# Patient Record
Sex: FEMALE | Race: WHITE | Employment: UNEMPLOYED | URBAN - METROPOLITAN AREA
[De-identification: names, ages, dates, MRNs, and addresses within clinical notes are randomized per-mention and may not be internally consistent; named-entity substitution may affect disease eponyms.]

---

## 2017-01-07 ENCOUNTER — HOSPITAL ENCOUNTER (EMERGENCY)
Age: 82
Discharge: HOME | End: 2017-01-07
Payer: MEDICARE

## 2017-01-07 VITALS
OXYGEN SATURATION: 92 % | HEIGHT: 66 IN | TEMPERATURE: 98 F | WEIGHT: 168 LBS | HEART RATE: 60 BPM | RESPIRATION RATE: 20 BRPM | BODY MASS INDEX: 27 KG/M2 | SYSTOLIC BLOOD PRESSURE: 156 MMHG | DIASTOLIC BLOOD PRESSURE: 96 MMHG

## 2017-01-07 DIAGNOSIS — R04.0 EPISTAXIS: Primary | ICD-10-CM

## 2017-01-07 LAB
APTT PPP: 27.1 SEC (ref 25.3–32.9)
BASOPHILS # BLD AUTO: 0.1 K/UL (ref 0–0.2)
BASOPHILS # BLD: 1 % (ref 0–2)
DIFFERENTIAL METHOD BLD: ABNORMAL
EOSINOPHIL # BLD: 0.4 K/UL (ref 0–0.8)
EOSINOPHIL NFR BLD: 5 % (ref 0.5–7.8)
ERYTHROCYTE [DISTWIDTH] IN BLOOD BY AUTOMATED COUNT: 14.2 % (ref 11.9–14.6)
HCT VFR BLD AUTO: 41.2 % (ref 35.8–46.3)
HGB BLD-MCNC: 13.6 G/DL (ref 11.7–15.4)
IMM GRANULOCYTES # BLD: 0 K/UL (ref 0–0.5)
IMM GRANULOCYTES NFR BLD AUTO: 0.2 % (ref 0–5)
INR PPP: 1.1 (ref 0.9–1.2)
LYMPHOCYTES # BLD AUTO: 18 % (ref 13–44)
LYMPHOCYTES # BLD: 1.5 K/UL (ref 0.5–4.6)
MCH RBC QN AUTO: 30.2 PG (ref 26.1–32.9)
MCHC RBC AUTO-ENTMCNC: 33 G/DL (ref 31.4–35)
MCV RBC AUTO: 91.4 FL (ref 79.6–97.8)
MONOCYTES # BLD: 0.7 K/UL (ref 0.1–1.3)
MONOCYTES NFR BLD AUTO: 9 % (ref 4–12)
NEUTS SEG # BLD: 5.4 K/UL (ref 1.7–8.2)
NEUTS SEG NFR BLD AUTO: 67 % (ref 43–78)
PLATELET # BLD AUTO: 245 K/UL (ref 150–450)
PMV BLD AUTO: 10.7 FL (ref 10.8–14.1)
PROTHROMBIN TIME: 11.4 SEC (ref 9.6–12)
RBC # BLD AUTO: 4.51 M/UL (ref 4.05–5.25)
WBC # BLD AUTO: 8.1 K/UL (ref 4.3–11.1)

## 2017-01-07 PROCEDURE — 85610 PROTHROMBIN TIME: CPT

## 2017-01-07 PROCEDURE — 75810000284 HC CNTRL NASAL HEMORHRAGE SIMPLE

## 2017-01-07 PROCEDURE — 85025 COMPLETE CBC W/AUTO DIFF WBC: CPT

## 2017-01-07 PROCEDURE — 77030013848 HC PK NSL EPITAX S&N -B

## 2017-01-07 PROCEDURE — 99284 EMERGENCY DEPT VISIT MOD MDM: CPT

## 2017-01-07 PROCEDURE — 85730 THROMBOPLASTIN TIME PARTIAL: CPT

## 2017-01-07 PROCEDURE — 74011250637 HC RX REV CODE- 250/637

## 2017-01-07 RX ORDER — HYDROCODONE BITARTRATE AND ACETAMINOPHEN 5; 325 MG/1; MG/1
1 TABLET ORAL
Qty: 6 TAB | Refills: 0 | Status: SHIPPED | OUTPATIENT
Start: 2017-01-07 | End: 2017-03-01

## 2017-01-07 RX ORDER — CEPHALEXIN 500 MG/1
500 CAPSULE ORAL 4 TIMES DAILY
Qty: 28 CAP | Refills: 0 | Status: SHIPPED | OUTPATIENT
Start: 2017-01-07 | End: 2017-01-14

## 2017-01-07 RX ORDER — OXYMETAZOLINE HCL 0.05 %
2 SPRAY, NON-AEROSOL (ML) NASAL
Status: COMPLETED | OUTPATIENT
Start: 2017-01-07 | End: 2017-01-07

## 2017-01-07 RX ADMIN — OXYMETAZOLINE HYDROCHLORIDE 2 SPRAY: 5 SPRAY NASAL at 14:10

## 2017-01-07 NOTE — ED PROVIDER NOTES
HPI Comments: 68-year-old female brought him or spell for anterior nosebleed. Patient's no spontaneous start bleeding about 3 hours ago family instructed compression without complete resolution of the bleeding. Patient is not on any anticoagulation therapy other than aspirin a day. Patient is a 80 y.o. female presenting with epistaxis. The history is provided by the patient and a relative. Epistaxis    This is a new problem. The current episode started 3 to 5 hours ago. The problem occurs constantly. The problem has not changed since onset. The problem is associated with nothing. The bleeding has been from the left nare. She has tried applying pressure for the symptoms. The treatment provided no relief. Her past medical history does not include bleeding disorder or sinus problems. Past Medical History:   Diagnosis Date    Arthritis     Cancer ([facility] [address]      Colon    Hypertension     Ill-defined condition      MVP    Ill-defined condition      PVD    Ill-defined condition      Urinary retention    Neurological disorder     Psychiatric disorder      Anxiety/depression       Past Surgical History:   Procedure Laterality Date    Hx gyn      Hx heent           No family history on file. Social History     Social History    Marital status:      Spouse name: N/A    Number of children: N/A    Years of education: N/A     Occupational History    Not on file. Social History Main Topics    Smoking status: Not on file    Smokeless tobacco: Not on file    Alcohol use No    Drug use: No    Sexual activity: Not on file     Other Topics Concern    Not on file     Social History Narrative         ALLERGIES: Neurontin [gabapentin]    Review of Systems   Constitutional: Negative. Negative for activity change. HENT: Negative. Eyes: Negative. Respiratory: Negative. Cardiovascular: Negative. Gastrointestinal: Negative. Genitourinary: Negative. Musculoskeletal: Negative. Skin: Negative. Neurological: Negative. Psychiatric/Behavioral: Negative. All other systems reviewed and are negative. Vitals:    01/07/17 1302   BP: 139/88   Pulse: 62   Resp: 20   Temp: 97.9 °F (36.6 °C)   SpO2: 94%   Weight: 76.2 kg (168 lb)   Height: 5' 6\" (1.676 m)            Physical Exam   Constitutional: She is oriented to person, place, and time. She appears well-developed and well-nourished. No distress. HENT:   Head: Normocephalic and atraumatic. Right Ear: External ear normal.   Left Ear: External ear normal.   Nose: Epistaxis is observed. Mouth/Throat: Oropharynx is clear and moist. No oropharyngeal exudate. Eyes: Conjunctivae and EOM are normal. Pupils are equal, round, and reactive to light. Right eye exhibits no discharge. Left eye exhibits no discharge. No scleral icterus. Neck: Normal range of motion. Neck supple. No JVD present. No tracheal deviation present. Cardiovascular: Normal rate, regular rhythm and intact distal pulses. Pulmonary/Chest: Effort normal and breath sounds normal. No stridor. No respiratory distress. She has no wheezes. She exhibits no tenderness. Abdominal: Soft. Bowel sounds are normal. She exhibits no distension and no mass. There is no tenderness. Musculoskeletal: Normal range of motion. She exhibits no edema or tenderness. Neurological: She is alert and oriented to person, place, and time. No cranial nerve deficit. Skin: Skin is warm and dry. No rash noted. She is not diaphoretic. No erythema. No pallor. Psychiatric: She has a normal mood and affect. Her behavior is normal. Thought content normal.   Nursing note and vitals reviewed. MDM  Number of Diagnoses or Management Options  Diagnosis management comments: Nasal balloon inserted without difficulty 5 cc's of air complaining balloon with good hemostasis.        Amount and/or Complexity of Data Reviewed  Clinical lab tests: reviewed and ordered  Tests in the medicine section of CPT®: reviewed and ordered    Risk of Complications, Morbidity, and/or Mortality  Presenting problems: moderate  Diagnostic procedures: moderate  Management options: moderate      ED Course       Epistaxis Management  Date/Time: 1/7/2017 2:18 PM  Performed by: Zoie Vega  Authorized by: Zoie Vega     Consent:     Consent obtained:  Verbal    Consent given by:  Patient and healthcare agent    Risks discussed:  Bleeding, infection, nasal injury and pain    Alternatives discussed:  Delayed treatment, alternative treatment and referral  Anesthesia (see MAR for exact dosages): Anesthesia method:  None  Procedure details:     Treatment site:  L anterior    Treatment complexity:  Limited    Treatment episode: initial    Post-procedure details:     Assessment:  Bleeding stopped    Patient tolerance of procedure:   Tolerated well, no immediate complications

## 2017-01-07 NOTE — ED NOTES
I have reviewed medications, follow up provider options, and discharge instructions with the patient and daughter. The patient and daughter verbalized understanding. Copy of discharge information given to daughter upon discharge. Prescription(s) given to daughter. Patient discharged in no distress.  Patient instructed not to drive while under influence of Norco.

## 2017-01-07 NOTE — DISCHARGE INSTRUCTIONS
Nosebleeds: Care Instructions  Your Care Instructions    Nosebleeds are common, especially if you have colds or allergies. Many things can cause a nosebleed. Some nosebleeds stop on their own with pressure. Others need packing. Some get cauterized (sealed). If you have gauze or other packing materials in your nose, you will need to follow up with your doctor to have the packing removed. You may need more treatment if you get nosebleeds a lot. The doctor has checked you carefully, but problems can develop later. If you notice any problems or new symptoms, get medical treatment right away. Follow-up care is a key part of your treatment and safety. Be sure to make and go to all appointments, and call your doctor if you are having problems. It's also a good idea to know your test results and keep a list of the medicines you take. How can you care for yourself at home? · If you get another nosebleed:  ¨ Sit up and tilt your head slightly forward. This keeps blood from going down your throat. ¨ Use your thumb and index finger to pinch your nose shut for 10 minutes. Use a clock. Do not check to see if the bleeding has stopped before the 10 minutes are up. If the bleeding has not stopped, pinch your nose shut for another 10 minutes. ¨ When the bleeding has stopped, try not to pick, rub, or blow your nose for 12 hours. Avoiding these things helps keep your nose from bleeding again. · If your doctor prescribed antibiotics, take them as directed. Do not stop taking them just because you feel better. You need to take the full course of antibiotics. To prevent nosebleeds  · Do not blow your nose too hard. · Try not to lift or strain after a nosebleed. · Raise your head on a pillow while you sleep. · Put a thin layer of a saline- or water-based nasal gel, such as NasoGel, inside your nose. Put it on the septum, which divides your nostrils. This will prevent dryness that can cause nosebleeds.   · Use a vaporizer or humidifier to add moisture to your bedroom. Follow the directions for cleaning the machine. · Do not use aspirin, ibuprofen (Advil, Motrin), or naproxen (Aleve) for 36 to 48 hours after a nosebleed unless your doctor tells you to. You can use acetaminophen (Tylenol) for pain relief. · Talk to your doctor about stopping any other medicines you are taking. Some medicines may make you more likely to get a nosebleed. · Do not use cold medicines or nasal sprays without first talking to your doctor. They can make your nose dry. When should you call for help? Call 911 anytime you think you may need emergency care. For example, call if:  · You passed out (lost consciousness). Call your doctor now or seek immediate medical care if:  · You get another nosebleed and your nose is still bleeding after you have applied pressure 3 times for 10 minutes each time (30 minutes total). · There is a lot of blood running down the back of your throat even after you pinch your nose and tilt your head forward. · You have a fever. · You have sinus pain. Watch closely for changes in your health, and be sure to contact your doctor if:  · You get nosebleeds often, even if they stop. · You do not get better as expected. Where can you learn more? Go to http://kaylin-zara.info/. Enter S156 in the search box to learn more about \"Nosebleeds: Care Instructions. \"  Current as of: May 27, 2016  Content Version: 11.1  © 6619-4100 [employer]. Care instructions adapted under license by [employer] (which disclaims liability or warranty for this information). If you have questions about a medical condition or this instruction, always ask your healthcare professional. [address] any warranty or liability for your use of this information.

## 2017-02-20 ENCOUNTER — HOSPITAL ENCOUNTER (INPATIENT)
Age: 82
LOS: 9 days | Discharge: NURSING, REHAB OR LONG-TERM CARE | DRG: 871 | End: 2017-03-01
Attending: EMERGENCY MEDICINE | Admitting: INTERNAL MEDICINE
Payer: MEDICARE

## 2017-02-20 ENCOUNTER — APPOINTMENT (OUTPATIENT)
Dept: GENERAL RADIOLOGY | Age: 82
DRG: 871 | End: 2017-02-20
Attending: EMERGENCY MEDICINE
Payer: MEDICARE

## 2017-02-20 DIAGNOSIS — E87.20 LACTIC ACIDOSIS: ICD-10-CM

## 2017-02-20 DIAGNOSIS — A41.9 SEPSIS, DUE TO UNSPECIFIED ORGANISM: ICD-10-CM

## 2017-02-20 DIAGNOSIS — E87.1 HYPONATREMIA: ICD-10-CM

## 2017-02-20 DIAGNOSIS — J10.1 INFLUENZA A: ICD-10-CM

## 2017-02-20 DIAGNOSIS — N17.9 AKI (ACUTE KIDNEY INJURY) (HCC): ICD-10-CM

## 2017-02-20 DIAGNOSIS — R77.8 ELEVATED TROPONIN: ICD-10-CM

## 2017-02-20 DIAGNOSIS — J11.1 INFLUENZA: ICD-10-CM

## 2017-02-20 DIAGNOSIS — Z66 DNR (DO NOT RESUSCITATE): ICD-10-CM

## 2017-02-20 DIAGNOSIS — N39.0 URINARY TRACT INFECTION, SITE UNSPECIFIED: ICD-10-CM

## 2017-02-20 DIAGNOSIS — J96.01 ACUTE RESPIRATORY FAILURE WITH HYPOXIA (HCC): Primary | ICD-10-CM

## 2017-02-20 DIAGNOSIS — J18.9 HCAP (HEALTHCARE-ASSOCIATED PNEUMONIA): ICD-10-CM

## 2017-02-20 DIAGNOSIS — R77.8 ELEVATED TROPONIN I LEVEL: ICD-10-CM

## 2017-02-20 DIAGNOSIS — J12.9 VIRAL PNEUMONIA: ICD-10-CM

## 2017-02-20 LAB
ALBUMIN SERPL BCP-MCNC: 3.6 G/DL (ref 3.2–4.6)
ALBUMIN/GLOB SERPL: 0.9 {RATIO} (ref 1.2–3.5)
ALP SERPL-CCNC: 88 U/L (ref 50–136)
ALT SERPL-CCNC: 150 U/L (ref 12–65)
ANION GAP BLD CALC-SCNC: 16 MMOL/L (ref 7–16)
ARTERIAL PATENCY WRIST A: POSITIVE
AST SERPL W P-5'-P-CCNC: 338 U/L (ref 15–37)
ATRIAL RATE: 82 BPM
BACTERIA URNS QL MICRO: ABNORMAL /HPF
BASE DEFICIT BLDA-SCNC: 5.2 MMOL/L (ref 0–2)
BASOPHILS # BLD AUTO: 0 K/UL (ref 0–0.2)
BASOPHILS # BLD: 0 % (ref 0–2)
BDY SITE: ABNORMAL
BILIRUB SERPL-MCNC: 1 MG/DL (ref 0.2–1.1)
BUN SERPL-MCNC: 27 MG/DL (ref 8–23)
CALCIUM SERPL-MCNC: 9.2 MG/DL (ref 8.3–10.4)
CALCULATED P AXIS, ECG09: 90 DEGREES
CALCULATED R AXIS, ECG10: 84 DEGREES
CALCULATED T AXIS, ECG11: -81 DEGREES
CASTS URNS QL MICRO: 0 /LPF
CHLORIDE SERPL-SCNC: 90 MMOL/L (ref 98–107)
CO2 SERPL-SCNC: 26 MMOL/L (ref 21–32)
COHGB MFR BLD: 0.9 % (ref 0.5–1.5)
CREAT SERPL-MCNC: 1.44 MG/DL (ref 0.6–1)
CRYSTALS URNS QL MICRO: 0 /LPF
DIAGNOSIS, 93000: NORMAL
DIASTOLIC BP, ECG02: NORMAL MMHG
DIFFERENTIAL METHOD BLD: ABNORMAL
DO-HGB BLD-MCNC: 1 % (ref 0–5)
EOSINOPHIL # BLD: 0 K/UL (ref 0–0.8)
EOSINOPHIL NFR BLD: 0 % (ref 0.5–7.8)
EPI CELLS #/AREA URNS HPF: ABNORMAL /HPF
ERYTHROCYTE [DISTWIDTH] IN BLOOD BY AUTOMATED COUNT: 13.2 % (ref 11.9–14.6)
FIO2 ON VENT: 100 %
GLOBULIN SER CALC-MCNC: 3.8 G/DL (ref 2.3–3.5)
GLUCOSE SERPL-MCNC: 195 MG/DL (ref 65–100)
HCO3 BLDA-SCNC: 20 MMOL/L (ref 22–26)
HCT VFR BLD AUTO: 49.1 % (ref 35.8–46.3)
HGB BLD-MCNC: 16.8 G/DL (ref 11.7–15.4)
HGB BLDMV-MCNC: 16.7 GM/DL (ref 11.7–15)
IMM GRANULOCYTES # BLD: 0.2 K/UL (ref 0–0.5)
INR PPP: 1.1 (ref 0.9–1.2)
LACTATE BLD-SCNC: 3.3 MMOL/L (ref 0.5–1.9)
LACTATE BLD-SCNC: 7.1 MMOL/L (ref 0.5–1.9)
LYMPHOCYTES # BLD AUTO: 9 % (ref 13–44)
LYMPHOCYTES # BLD: 1.6 K/UL (ref 0.5–4.6)
MAGNESIUM SERPL-MCNC: 2.2 MG/DL (ref 1.8–2.4)
MCH RBC QN AUTO: 31.3 PG (ref 26.1–32.9)
MCHC RBC AUTO-ENTMCNC: 34.2 G/DL (ref 31.4–35)
MCV RBC AUTO: 91.6 FL (ref 79.6–97.8)
METHGB MFR BLD: 0.7 % (ref 0–1.5)
MONOCYTES # BLD: 0.9 K/UL (ref 0.1–1.3)
MONOCYTES NFR BLD AUTO: 5 % (ref 4–12)
MUCOUS THREADS URNS QL MICRO: 0 /LPF
NEUTS SEG # BLD: 14.9 K/UL (ref 1.7–8.2)
NEUTS SEG NFR BLD AUTO: 86 % (ref 43–78)
OTHER OBSERVATIONS,UCOM: ABNORMAL
OXYHGB MFR BLDA: 97.6 % (ref 94–97)
P-R INTERVAL, ECG05: 216 MS
PCO2 BLDA: 37 MMHG (ref 35–45)
PH BLDA: 7.35 [PH] (ref 7.35–7.45)
PLATELET # BLD AUTO: 241 K/UL (ref 150–450)
PLATELET COMMENTS,PCOM: ADEQUATE
PMV BLD AUTO: 11 FL (ref 10.8–14.1)
PO2 BLDA: 175 MMHG (ref 75–100)
POTASSIUM SERPL-SCNC: 4.1 MMOL/L (ref 3.5–5.1)
PROCALCITONIN SERPL-MCNC: 0.3 NG/ML
PROT SERPL-MCNC: 7.4 G/DL (ref 6.3–8.2)
PROTHROMBIN TIME: 12.5 SEC (ref 9.6–12)
Q-T INTERVAL, ECG07: 406 MS
QRS DURATION, ECG06: 112 MS
QTC CALCULATION (BEZET), ECG08: 474 MS
RBC # BLD AUTO: 5.36 M/UL (ref 4.05–5.25)
RBC #/AREA URNS HPF: ABNORMAL /HPF
RBC MORPH BLD: ABNORMAL
SAO2 % BLD: 99 % (ref 92–98.5)
SERVICE CMNT-IMP: ABNORMAL
SODIUM SERPL-SCNC: 132 MMOL/L (ref 136–145)
SYSTOLIC BP, ECG01: NORMAL MMHG
TROPONIN I SERPL-MCNC: 1.5 NG/ML (ref 0.02–0.05)
VENTILATION MODE VENT: ABNORMAL
VENTRICULAR RATE, ECG03: 82 BPM
WBC # BLD AUTO: 17.3 K/UL (ref 4.3–11.1)
WBC MORPH BLD: ABNORMAL
WBC URNS QL MICRO: ABNORMAL /HPF

## 2017-02-20 PROCEDURE — 99223 1ST HOSP IP/OBS HIGH 75: CPT | Performed by: INTERNAL MEDICINE

## 2017-02-20 PROCEDURE — 85610 PROTHROMBIN TIME: CPT | Performed by: EMERGENCY MEDICINE

## 2017-02-20 PROCEDURE — 77030021668 HC NEB PREFIL KT VYRM -A

## 2017-02-20 PROCEDURE — 74011250636 HC RX REV CODE- 250/636: Performed by: EMERGENCY MEDICINE

## 2017-02-20 PROCEDURE — 87086 URINE CULTURE/COLONY COUNT: CPT | Performed by: EMERGENCY MEDICINE

## 2017-02-20 PROCEDURE — 93005 ELECTROCARDIOGRAM TRACING: CPT | Performed by: EMERGENCY MEDICINE

## 2017-02-20 PROCEDURE — 71010 XR CHEST PORT: CPT

## 2017-02-20 PROCEDURE — 81015 MICROSCOPIC EXAM OF URINE: CPT | Performed by: EMERGENCY MEDICINE

## 2017-02-20 PROCEDURE — 87040 BLOOD CULTURE FOR BACTERIA: CPT | Performed by: EMERGENCY MEDICINE

## 2017-02-20 PROCEDURE — 74011000250 HC RX REV CODE- 250: Performed by: EMERGENCY MEDICINE

## 2017-02-20 PROCEDURE — 87088 URINE BACTERIA CULTURE: CPT | Performed by: EMERGENCY MEDICINE

## 2017-02-20 PROCEDURE — 96375 TX/PRO/DX INJ NEW DRUG ADDON: CPT | Performed by: EMERGENCY MEDICINE

## 2017-02-20 PROCEDURE — 83735 ASSAY OF MAGNESIUM: CPT | Performed by: EMERGENCY MEDICINE

## 2017-02-20 PROCEDURE — 74011250636 HC RX REV CODE- 250/636: Performed by: INTERNAL MEDICINE

## 2017-02-20 PROCEDURE — 80053 COMPREHEN METABOLIC PANEL: CPT | Performed by: EMERGENCY MEDICINE

## 2017-02-20 PROCEDURE — 65270000029 HC RM PRIVATE

## 2017-02-20 PROCEDURE — 81003 URINALYSIS AUTO W/O SCOPE: CPT | Performed by: EMERGENCY MEDICINE

## 2017-02-20 PROCEDURE — 74011000258 HC RX REV CODE- 258: Performed by: EMERGENCY MEDICINE

## 2017-02-20 PROCEDURE — 85025 COMPLETE CBC W/AUTO DIFF WBC: CPT | Performed by: EMERGENCY MEDICINE

## 2017-02-20 PROCEDURE — 84484 ASSAY OF TROPONIN QUANT: CPT | Performed by: EMERGENCY MEDICINE

## 2017-02-20 PROCEDURE — 87186 SC STD MICRODIL/AGAR DIL: CPT | Performed by: EMERGENCY MEDICINE

## 2017-02-20 PROCEDURE — 96365 THER/PROPH/DIAG IV INF INIT: CPT | Performed by: EMERGENCY MEDICINE

## 2017-02-20 PROCEDURE — 84145 PROCALCITONIN (PCT): CPT | Performed by: EMERGENCY MEDICINE

## 2017-02-20 PROCEDURE — 83605 ASSAY OF LACTIC ACID: CPT

## 2017-02-20 PROCEDURE — 99285 EMERGENCY DEPT VISIT HI MDM: CPT | Performed by: EMERGENCY MEDICINE

## 2017-02-20 PROCEDURE — 74011250637 HC RX REV CODE- 250/637: Performed by: EMERGENCY MEDICINE

## 2017-02-20 PROCEDURE — 36600 WITHDRAWAL OF ARTERIAL BLOOD: CPT

## 2017-02-20 PROCEDURE — 82803 BLOOD GASES ANY COMBINATION: CPT

## 2017-02-20 RX ORDER — OSELTAMIVIR PHOSPHATE 30 MG/1
30 CAPSULE ORAL
Status: DISCONTINUED | OUTPATIENT
Start: 2017-02-22 | End: 2017-02-21

## 2017-02-20 RX ORDER — OSELTAMIVIR PHOSPHATE 30 MG/1
30 CAPSULE ORAL
Status: COMPLETED | OUTPATIENT
Start: 2017-02-20 | End: 2017-02-20

## 2017-02-20 RX ORDER — SENNOSIDES 8.6 MG/1
1 TABLET ORAL
Status: DISCONTINUED | OUTPATIENT
Start: 2017-02-21 | End: 2017-03-01 | Stop reason: HOSPADM

## 2017-02-20 RX ORDER — FENOFIBRATE 54 MG/1
54 TABLET ORAL DAILY
Status: DISCONTINUED | OUTPATIENT
Start: 2017-02-21 | End: 2017-03-01 | Stop reason: HOSPADM

## 2017-02-20 RX ORDER — MEMANTINE HYDROCHLORIDE 5 MG/1
10 TABLET ORAL 2 TIMES DAILY
Status: DISCONTINUED | OUTPATIENT
Start: 2017-02-21 | End: 2017-03-01 | Stop reason: HOSPADM

## 2017-02-20 RX ORDER — OSELTAMIVIR PHOSPHATE 75 MG/1
75 CAPSULE ORAL
Status: DISCONTINUED | OUTPATIENT
Start: 2017-02-20 | End: 2017-02-20 | Stop reason: DRUGHIGH

## 2017-02-20 RX ORDER — OSELTAMIVIR PHOSPHATE 75 MG/1
75 CAPSULE ORAL 2 TIMES DAILY
Status: DISCONTINUED | OUTPATIENT
Start: 2017-02-21 | End: 2017-02-21 | Stop reason: SDUPTHER

## 2017-02-20 RX ORDER — IPRATROPIUM BROMIDE AND ALBUTEROL SULFATE 2.5; .5 MG/3ML; MG/3ML
3 SOLUTION RESPIRATORY (INHALATION)
Status: COMPLETED | OUTPATIENT
Start: 2017-02-20 | End: 2017-02-20

## 2017-02-20 RX ORDER — SODIUM CHLORIDE 9 MG/ML
100 INJECTION, SOLUTION INTRAVENOUS CONTINUOUS
Status: DISCONTINUED | OUTPATIENT
Start: 2017-02-20 | End: 2017-02-21

## 2017-02-20 RX ORDER — SODIUM CHLORIDE 0.9 % (FLUSH) 0.9 %
5-10 SYRINGE (ML) INJECTION AS NEEDED
Status: DISCONTINUED | OUTPATIENT
Start: 2017-02-20 | End: 2017-03-01 | Stop reason: HOSPADM

## 2017-02-20 RX ORDER — GUAIFENESIN 100 MG/5ML
81 LIQUID (ML) ORAL DAILY
Status: DISCONTINUED | OUTPATIENT
Start: 2017-02-21 | End: 2017-03-01 | Stop reason: HOSPADM

## 2017-02-20 RX ORDER — HYDROCHLOROTHIAZIDE 25 MG/1
25 TABLET ORAL DAILY
Status: DISCONTINUED | OUTPATIENT
Start: 2017-02-21 | End: 2017-03-01 | Stop reason: HOSPADM

## 2017-02-20 RX ORDER — ACETAMINOPHEN 325 MG/1
650 TABLET ORAL
Status: DISCONTINUED | OUTPATIENT
Start: 2017-02-20 | End: 2017-03-01 | Stop reason: HOSPADM

## 2017-02-20 RX ORDER — ENOXAPARIN SODIUM 100 MG/ML
30 INJECTION SUBCUTANEOUS EVERY 24 HOURS
Status: DISCONTINUED | OUTPATIENT
Start: 2017-02-21 | End: 2017-02-21

## 2017-02-20 RX ORDER — AMOXICILLIN 500 MG/1
500 CAPSULE ORAL
COMMUNITY
End: 2017-03-01

## 2017-02-20 RX ORDER — CLORAZEPATE DIPOTASSIUM 7.5 MG/1
3.75 TABLET ORAL 3 TIMES DAILY
Status: DISCONTINUED | OUTPATIENT
Start: 2017-02-21 | End: 2017-03-01 | Stop reason: HOSPADM

## 2017-02-20 RX ORDER — LANOLIN ALCOHOL/MO/W.PET/CERES
1 CREAM (GRAM) TOPICAL
Status: DISCONTINUED | OUTPATIENT
Start: 2017-02-21 | End: 2017-03-01 | Stop reason: HOSPADM

## 2017-02-20 RX ORDER — PREGABALIN 50 MG/1
50 CAPSULE ORAL 3 TIMES DAILY
Status: DISCONTINUED | OUTPATIENT
Start: 2017-02-21 | End: 2017-03-01 | Stop reason: HOSPADM

## 2017-02-20 RX ORDER — ATENOLOL 50 MG/1
100 TABLET ORAL DAILY
Status: DISCONTINUED | OUTPATIENT
Start: 2017-02-21 | End: 2017-03-01 | Stop reason: HOSPADM

## 2017-02-20 RX ORDER — FOLIC ACID 1 MG/1
1 TABLET ORAL DAILY
Status: DISCONTINUED | OUTPATIENT
Start: 2017-02-21 | End: 2017-03-01 | Stop reason: HOSPADM

## 2017-02-20 RX ORDER — OSELTAMIVIR PHOSPHATE 75 MG/1
75 CAPSULE ORAL 2 TIMES DAILY
COMMUNITY
End: 2017-03-01

## 2017-02-20 RX ORDER — VANCOMYCIN HYDROCHLORIDE
1250 EVERY 24 HOURS
Status: DISCONTINUED | OUTPATIENT
Start: 2017-02-20 | End: 2017-02-23

## 2017-02-20 RX ORDER — ESCITALOPRAM OXALATE 10 MG/1
20 TABLET ORAL
Status: DISCONTINUED | OUTPATIENT
Start: 2017-02-21 | End: 2017-03-01 | Stop reason: HOSPADM

## 2017-02-20 RX ORDER — SODIUM CHLORIDE 0.9 % (FLUSH) 0.9 %
5-10 SYRINGE (ML) INJECTION EVERY 8 HOURS
Status: DISCONTINUED | OUTPATIENT
Start: 2017-02-21 | End: 2017-03-01 | Stop reason: HOSPADM

## 2017-02-20 RX ORDER — DULOXETIN HYDROCHLORIDE 30 MG/1
30 CAPSULE, DELAYED RELEASE ORAL DAILY
Status: DISCONTINUED | OUTPATIENT
Start: 2017-02-21 | End: 2017-03-01 | Stop reason: HOSPADM

## 2017-02-20 RX ADMIN — SODIUM CHLORIDE 1000 ML: 900 INJECTION, SOLUTION INTRAVENOUS at 19:20

## 2017-02-20 RX ADMIN — IPRATROPIUM BROMIDE AND ALBUTEROL SULFATE 3 ML: 2.5; .5 SOLUTION RESPIRATORY (INHALATION) at 19:20

## 2017-02-20 RX ADMIN — VANCOMYCIN HYDROCHLORIDE 1250 MG: 10 INJECTION, POWDER, LYOPHILIZED, FOR SOLUTION INTRAVENOUS at 22:00

## 2017-02-20 RX ADMIN — OSELTAMIVIR PHOSPHATE 30 MG: 30 CAPSULE ORAL at 20:28

## 2017-02-20 RX ADMIN — METHYLPREDNISOLONE SODIUM SUCCINATE 125 MG: 125 INJECTION, POWDER, FOR SOLUTION INTRAMUSCULAR; INTRAVENOUS at 19:03

## 2017-02-20 RX ADMIN — PIPERACILLIN SODIUM,TAZOBACTAM SODIUM 4.5 G: 4; .5 INJECTION, POWDER, FOR SOLUTION INTRAVENOUS at 19:35

## 2017-02-20 NOTE — ED TRIAGE NOTES
Found to be unresponsive at the [facility], was given approx 5 min of CPR, on EMS arrival pt found to have a bounding pulse, given 2mg of Narcan, via EMS, GAMINO, follows some commands appropriately

## 2017-02-21 LAB
ANION GAP BLD CALC-SCNC: 12 MMOL/L (ref 7–16)
BACTERIA SPEC CULT: NORMAL
BUN SERPL-MCNC: 29 MG/DL (ref 8–23)
CALCIUM SERPL-MCNC: 8.1 MG/DL (ref 8.3–10.4)
CHLORIDE SERPL-SCNC: 98 MMOL/L (ref 98–107)
CO2 SERPL-SCNC: 27 MMOL/L (ref 21–32)
CREAT SERPL-MCNC: 0.97 MG/DL (ref 0.6–1)
ERYTHROCYTE [DISTWIDTH] IN BLOOD BY AUTOMATED COUNT: 13.1 % (ref 11.9–14.6)
GLUCOSE SERPL-MCNC: 160 MG/DL (ref 65–100)
HCT VFR BLD AUTO: 44.3 % (ref 35.8–46.3)
HGB BLD-MCNC: 15.3 G/DL (ref 11.7–15.4)
MCH RBC QN AUTO: 31.4 PG (ref 26.1–32.9)
MCHC RBC AUTO-ENTMCNC: 34.5 G/DL (ref 31.4–35)
MCV RBC AUTO: 90.8 FL (ref 79.6–97.8)
PLATELET # BLD AUTO: 157 K/UL (ref 150–450)
PMV BLD AUTO: 10.6 FL (ref 10.8–14.1)
POTASSIUM SERPL-SCNC: 3.2 MMOL/L (ref 3.5–5.1)
RBC # BLD AUTO: 4.88 M/UL (ref 4.05–5.25)
SERVICE CMNT-IMP: NORMAL
SODIUM SERPL-SCNC: 137 MMOL/L (ref 136–145)
TROPONIN I SERPL-MCNC: 1.97 NG/ML (ref 0.02–0.05)
TROPONIN I SERPL-MCNC: 2.05 NG/ML (ref 0.02–0.05)
TROPONIN I SERPL-MCNC: 2.56 NG/ML (ref 0.02–0.05)
TROPONIN I SERPL-MCNC: 2.57 NG/ML (ref 0.02–0.05)
WBC # BLD AUTO: 14 K/UL (ref 4.3–11.1)

## 2017-02-21 PROCEDURE — 74011000258 HC RX REV CODE- 258: Performed by: INTERNAL MEDICINE

## 2017-02-21 PROCEDURE — 74011250636 HC RX REV CODE- 250/636: Performed by: INTERNAL MEDICINE

## 2017-02-21 PROCEDURE — 87641 MR-STAPH DNA AMP PROBE: CPT | Performed by: INTERNAL MEDICINE

## 2017-02-21 PROCEDURE — 77010033678 HC OXYGEN DAILY

## 2017-02-21 PROCEDURE — 94760 N-INVAS EAR/PLS OXIMETRY 1: CPT

## 2017-02-21 PROCEDURE — 65270000029 HC RM PRIVATE

## 2017-02-21 PROCEDURE — 80048 BASIC METABOLIC PNL TOTAL CA: CPT | Performed by: INTERNAL MEDICINE

## 2017-02-21 PROCEDURE — 74011000250 HC RX REV CODE- 250: Performed by: INTERNAL MEDICINE

## 2017-02-21 PROCEDURE — 84484 ASSAY OF TROPONIN QUANT: CPT | Performed by: INTERNAL MEDICINE

## 2017-02-21 PROCEDURE — 85027 COMPLETE CBC AUTOMATED: CPT | Performed by: INTERNAL MEDICINE

## 2017-02-21 PROCEDURE — C8929 TTE W OR WO FOL WCON,DOPPLER: HCPCS

## 2017-02-21 PROCEDURE — 77030021668 HC NEB PREFIL KT VYRM -A

## 2017-02-21 PROCEDURE — 74011250637 HC RX REV CODE- 250/637: Performed by: INTERNAL MEDICINE

## 2017-02-21 PROCEDURE — 99232 SBSQ HOSP IP/OBS MODERATE 35: CPT | Performed by: INTERNAL MEDICINE

## 2017-02-21 PROCEDURE — 36415 COLL VENOUS BLD VENIPUNCTURE: CPT | Performed by: INTERNAL MEDICINE

## 2017-02-21 RX ORDER — ENOXAPARIN SODIUM 100 MG/ML
40 INJECTION SUBCUTANEOUS EVERY 24 HOURS
Status: DISCONTINUED | OUTPATIENT
Start: 2017-02-22 | End: 2017-03-01 | Stop reason: HOSPADM

## 2017-02-21 RX ORDER — OSELTAMIVIR PHOSPHATE 30 MG/1
30 CAPSULE ORAL EVERY 12 HOURS
Status: DISCONTINUED | OUTPATIENT
Start: 2017-02-21 | End: 2017-02-26

## 2017-02-21 RX ADMIN — HYDROCHLOROTHIAZIDE 25 MG: 25 TABLET ORAL at 08:12

## 2017-02-21 RX ADMIN — PREGABALIN 50 MG: 50 CAPSULE ORAL at 08:12

## 2017-02-21 RX ADMIN — CLORAZEPATE DIPOTASSIUM 3.75 MG: 7.5 TABLET ORAL at 23:25

## 2017-02-21 RX ADMIN — PREGABALIN 50 MG: 50 CAPSULE ORAL at 23:25

## 2017-02-21 RX ADMIN — ASPIRIN 81 MG 81 MG: 81 TABLET ORAL at 08:12

## 2017-02-21 RX ADMIN — VANCOMYCIN HYDROCHLORIDE 1250 MG: 10 INJECTION, POWDER, LYOPHILIZED, FOR SOLUTION INTRAVENOUS at 23:34

## 2017-02-21 RX ADMIN — SENNOSIDES 8.6 MG: 8.6 TABLET, FILM COATED ORAL at 23:25

## 2017-02-21 RX ADMIN — OSELTAMIVIR PHOSPHATE 30 MG: 30 CAPSULE ORAL at 23:25

## 2017-02-21 RX ADMIN — SENNOSIDES 8.6 MG: 8.6 TABLET, FILM COATED ORAL at 01:05

## 2017-02-21 RX ADMIN — PREGABALIN 50 MG: 50 CAPSULE ORAL at 16:54

## 2017-02-21 RX ADMIN — FENOFIBRATE 54 MG: 54 TABLET ORAL at 08:12

## 2017-02-21 RX ADMIN — Medication 5 ML: at 05:48

## 2017-02-21 RX ADMIN — PIPERACILLIN SODIUM,TAZOBACTAM SODIUM 4.5 G: 4; .5 INJECTION, POWDER, FOR SOLUTION INTRAVENOUS at 12:43

## 2017-02-21 RX ADMIN — DULOXETINE HYDROCHLORIDE 30 MG: 30 CAPSULE, DELAYED RELEASE ORAL at 08:12

## 2017-02-21 RX ADMIN — CLORAZEPATE DIPOTASSIUM 3.75 MG: 7.5 TABLET ORAL at 16:54

## 2017-02-21 RX ADMIN — ESCITALOPRAM OXALATE 20 MG: 10 TABLET ORAL at 16:54

## 2017-02-21 RX ADMIN — FERROUS SULFATE TAB 325 MG (65 MG ELEMENTAL FE) 325 MG: 325 (65 FE) TAB at 08:11

## 2017-02-21 RX ADMIN — PERFLUTREN 1 ML: 6.52 INJECTION, SUSPENSION INTRAVENOUS at 13:00

## 2017-02-21 RX ADMIN — ATENOLOL 100 MG: 50 TABLET ORAL at 08:12

## 2017-02-21 RX ADMIN — PIPERACILLIN SODIUM,TAZOBACTAM SODIUM 4.5 G: 4; .5 INJECTION, POWDER, FOR SOLUTION INTRAVENOUS at 01:05

## 2017-02-21 RX ADMIN — MEMANTINE HYDROCHLORIDE 10 MG: 5 TABLET ORAL at 08:12

## 2017-02-21 RX ADMIN — FOLIC ACID 1 MG: 1 TABLET ORAL at 08:12

## 2017-02-21 RX ADMIN — PIPERACILLIN SODIUM,TAZOBACTAM SODIUM 4.5 G: 4; .5 INJECTION, POWDER, FOR SOLUTION INTRAVENOUS at 23:26

## 2017-02-21 RX ADMIN — PREGABALIN 50 MG: 50 CAPSULE ORAL at 01:06

## 2017-02-21 RX ADMIN — MEMANTINE HYDROCHLORIDE 10 MG: 5 TABLET ORAL at 16:55

## 2017-02-21 RX ADMIN — CLORAZEPATE DIPOTASSIUM 3.75 MG: 7.5 TABLET ORAL at 08:11

## 2017-02-21 RX ADMIN — Medication 10 ML: at 22:00

## 2017-02-21 RX ADMIN — Medication 5 ML: at 00:17

## 2017-02-21 RX ADMIN — ENOXAPARIN SODIUM 30 MG: 30 INJECTION SUBCUTANEOUS at 08:12

## 2017-02-21 NOTE — ED PROVIDER NOTES
HPI Comments: Patient arrives to the ER via EMS for being found unresponsive. Apparently possibly an hour and half prior to arrival patient was minimally responsive and per the staff at nursing facility she stopped breathing. CPR was initiated for approximately 5 minutes. EMS reports upon their arrival patient was responsive with a pulse. However she was hypoxic and noted to have some significant rhonchorous air sounds and hypoxic. However her vital signs otherwise were stable. History obtained from nursing facility was that patient had been experiencing some fevers and cough. She apparently was diagnosed with the flu on yesterday. Patient is a 80 y.o. female presenting with cardiac arrest. The history is provided by the EMS personnel, the patient and the nursing home. The history is limited by the condition of the patient. Cardiac arrest    This is a new problem. The current episode started 1 to 2 hours ago. The problem has been gradually improving. Pertinent negatives include no abdominal pain, no back pain, no nausea, no numbness, no palpitations, no shortness of breath and no vomiting. Past Medical History:   Diagnosis Date    Arthritis     Cancer ([facility] [address]      Colon    Hypertension     Ill-defined condition      MVP    Ill-defined condition      PVD    Ill-defined condition      Urinary retention    Neurological disorder     Psychiatric disorder      Anxiety/depression       Past Surgical History:   Procedure Laterality Date    Hx gyn      Hx heent           No family history on file. Social History     Social History    Marital status:      Spouse name: N/A    Number of children: N/A    Years of education: N/A     Occupational History    Not on file.      Social History Main Topics    Smoking status: Not on file    Smokeless tobacco: Not on file    Alcohol use No    Drug use: No    Sexual activity: Not on file     Other Topics Concern    Not on file     Social History Narrative         ALLERGIES: Neurontin [gabapentin]    Review of Systems   Constitutional: Negative for fatigue and unexpected weight change. HENT: Negative for drooling and tinnitus. Eyes: Negative for photophobia. Respiratory: Negative for choking, chest tightness, shortness of breath and stridor. Cardiovascular: Negative for palpitations and leg swelling. Gastrointestinal: Negative for abdominal pain, nausea and vomiting. Endocrine: Negative for polyphagia and polyuria. Genitourinary: Negative for flank pain, frequency and urgency. Musculoskeletal: Negative for back pain and gait problem. Skin: Negative for pallor and rash. Allergic/Immunologic: Negative for food allergies and immunocompromised state. Neurological: Negative for light-headedness and numbness. Hematological: Negative for adenopathy. Does not bruise/bleed easily. Psychiatric/Behavioral: Negative for behavioral problems and confusion. All other systems reviewed and are negative. Vitals:    02/20/17 1900   BP: (!) 171/94   Pulse: 83   Resp: 24   SpO2: 96%   Weight: 77.1 kg (170 lb)   Height: 5' 6\" (1.676 m)            Physical Exam   Constitutional: She appears well-developed and well-nourished. She appears lethargic. She has a sickly appearance. HENT:   Head: Normocephalic and atraumatic. Mouth/Throat: Oropharynx is clear and moist.   Eyes: Conjunctivae and EOM are normal. Pupils are equal, round, and reactive to light. No scleral icterus. Neck: Normal range of motion. Neck supple. No tracheal deviation present. No thyromegaly present. Cardiovascular: Normal rate, regular rhythm and normal heart sounds. No murmur heard. Pulmonary/Chest: Effort normal. No respiratory distress. She has wheezes. She has rales. She exhibits no tenderness. Abdominal: Soft. Bowel sounds are normal. She exhibits no distension. There is no tenderness. Musculoskeletal: Normal range of motion.  She exhibits no edema or deformity. Neurological: She appears lethargic. No cranial nerve deficit. Coordination normal. GCS eye subscore is 1. GCS verbal subscore is 2. GCS motor subscore is 6. Nursing note and vitals reviewed. MDM  Number of Diagnoses or Management Options  Acute respiratory failure with hypoxia [facility]):   Elevated troponin I level:   Influenza A:   Lactic acidosis:   Viral pneumonia:   Diagnosis management comments: Differential diagnosis includes acute respiratory failure, secondary pneumonia from influenza, sepsis, cardiac arrest    We'll obtain stat portal chest x-ray, basic labs, blood and urine cultures  Also obtain an ABG here  Monitor symptoms for improvement    8:18 PM  Chest x-ray shows possible left basilar pneumonia. ABG does not show any respiratory acidosis. Patient continues to have significant hypoxia. We'll transition a higher flow oxygen. Zosyn as well as IV fluids initiated. Patient is sitting up more alert currently. 8:37 PM  Case discussed with pulmonary, they will see patient for admission. Patient's daughter stated that patient has a living will and patient is a DNR/DNI       Amount and/or Complexity of Data Reviewed  Clinical lab tests: reviewed and ordered  Tests in the radiology section of CPT®: ordered and reviewed  Decide to obtain previous medical records or to obtain history from someone other than the patient: yes ( Provider Note - Vadim Caldera MD - 02/19/2017 2:38 PM EST  Formatting of this note may be different from the original.      [address] [address]  [phone]    Ann Shepherd MD  2/19/2017    Name: Barrie Be Age: 80 y.o. CHIEF COMPLAINT:   Chief Complaint   Patient presents with    Sore Throat   Est pt. PT has c/o fever, cough, and throat pain that began on Friday     HPI:     80 y.o. female illness for 2 days, \"I thought it was a cold\".  She lives in assisted living and care taker stated she had temp 102F this AM. Bodyaches but chronic neuropathy. Daughter says she is weak and congested. Some throat pain. Some shortness of breath. Flu cases at her facility. PAST MEDICAL HISTORY:  Past Medical History   Diagnosis Date    Peripheral neuropathy    Spinal stenosis    MVP (mitral valve prolapse)   and PAT    Colon cancer    H/O colonoscopy 4/09   wnl except diverticuli and hemorrhoids    Osteopenia   improved 2007, sl worse 11/09    Hypertension    OA (osteoarthritis)   scoliosis , hip, spine    PVD (peripheral vascular disease)   mild by KAIN 1/8/08    Depression   sees [facility] months    Anxiety   sees [facility] q6 months    Urinary retention   and UTI's - use o self cath    Hearing loss   hearing aids     PAST SURGICAL HISTORY:  Past Surgical History   Procedure Laterality Date    Combined hysterectomy vaginal / oophorectomy / a&p repair / sacrospinous ligament suspension    Hemicolectomy Right   for colon Ca    Tonsillectomy and adenoidectomy    Nasal reconstruction    Steroid injection hip Right 2009, 2010, 2012     MEDICATIONS:  No current facility-administered medications for this encounter. Current Outpatient Prescriptions   Medication Sig Dispense Refill    aspirin (ECOTRIN) 81 mg EC tablet Take 81 mg by mouth.  atenolol (TENORMIN) 100 mg tablet Take 1 tablet (100 mg) by mouth daily. 90 tablet 2    cholecalciferol, vitamin D3, 400 unit capsule Take 400 Units.  clorazepate (TRANXENE) 3.75 mg tablet Take 3.75 mg by mouth daily.  cranberry extract 500 mg Take 2 capsules.  diclofenac-misoprostol (ARTHROTEC 75)  mg-mcg per tablet Take 1 tablet by mouth 2 (two) times a day. 180 tablet 3    DULoxetine (CYMBALTA) 30 mg capsule    escitalopram oxalate (LEXAPRO) 20 mg tablet Take 20 mg by mouth daily. 2 tabs a day    fenofibrate (TRICOR) 48 MG tablet TAKE 1 TABLET BY MOUTH ONCE DAILY.  30 tablet 11    ferrous sulfate 325 mg (65 mg iron) tablet Take 325 mg by mouth daily.    folic acid (FOLVITE) 1 mg tablet TAKE 1 TABLET BY MOUTH ONCE DAILY. 30 tablet 11    hydrochlorothiazide (HYDRODIURIL) 25 mg tablet Take 1 tablet (25 mg) by mouth daily. 90 tablet 2    INTEGRA 125-40-3 mg Take daily. 0    memantine (NAMENDA) 10 mg tablet Take 10 mg by mouth 2 (two) times a day.  pregabalin (LYRICA) 50 mg capsule Take 1 capsule (50 mg) 3 (three) times a day. 90 capsule 11     ALLERGIES:  Gabapentin    REVIEW OF SYSTEMS:  Fever  No nausea known  Sore throat  Wet cough    PHYSICAL EXAMINATION:  Filed Vitals:   02/19/17 1354   BP: 120/74   Pulse: 62   Temp: 97.7 °F (36.5 °C)   Resp: 17   SpO2: 95%     Constitutional: Appears well-nourished. No distress. Well kepmt, slow to talk and move but alert. EYE: no icterus or conjunctival injection bilaterally, no drainage. HENT:   Head: Atraumatic. No facial sinus tenderness. Hard of hearing, wearing lipstick. Nose: No active nasal discharge, nares patent. Mouth/Throat: Mucous membranes are moist. No tonsillar exudate. Oropharynx is clear. No uvula deviation. Neck: No tracheal deviation, supple, no cervical adenopathy, submandibular lymph nodes not palpable. Cardiac: Regular rate and rhythm, no murmur. Pulmonary/Chest: Respiratory effort normal without retractions, speaking in full sentences and clear to auscultation bilaterally. No wheezes. Neurological: Alert, exhibiting normal muscle tone. Skin: Skin is warm and dry. ASSESSMENT(S):  1. Triage assessment class 3, nonurgent   2. FLU A+    TREATMENT:    1. Anticipate fever for 3-5 days. 2. Please try a herbal hot tea with honey and lemon for the cough and to boost the immune system please consider trying OTC Sambuchol which is elderberry extract and full of antioxidants, no definite adverse effects known and it may help. 3. Tamiflu asap please.   4. Oxygen here today 95% if at any point fever more than 5 days or worsening of breathing then she should be taken to an Emerg Dept for possible higher level of care needs. 5. Tylenol for pain and fever 500mg every 4 hours. Procedures    Treatment plan reviewed with the patient and questions answered to assist with understanding of the plan. If new questions arise or failure to improve please call our office and request clinical staff such as one our nurses to assist.     Hunter Beltre MD    Influenza A/B-POC2/19/2017  [address] [facility]  Component Name Value Ref Range  Influenza A Rapid Test Positive for the presence of Influenza A virus antigen (A) Negative for the presence of Influenza A virus antigen   Influenza B Rapid Test Negative for the presence of Influenza B virus antigen Negative for the presence of Influenza B virus antigen   Specimen  Swab - Other    )    Risk of Complications, Morbidity, and/or Mortality  Presenting problems: high  Diagnostic procedures: moderate  Management options: high    Critical Care  Total time providing critical care: 30-74 minutes (Critical Care Time 60 Minutes: Excluding all billable procedures, time spent at the bedside directing patients resuscitation, updating family, and coordinating care with consultants  )    Patient Progress  Patient progress: stable    ED Course       Procedures      Results Include:    Recent Results (from the past 24 hour(s))   EKG, 12 LEAD, INITIAL    Collection Time: 02/20/17  6:59 PM   Result Value Ref Range    Systolic BP  mmHg    Diastolic BP  mmHg    Ventricular Rate 82 BPM    Atrial Rate 82 BPM    P-R Interval 216 ms    QRS Duration 112 ms    Q-T Interval 406 ms    QTC Calculation (Bezet) 474 ms    Calculated P Axis 90 degrees    Calculated R Axis 84 degrees    Calculated T Axis -81 degrees    Diagnosis       !! AGE AND GENDER SPECIFIC ECG ANALYSIS !!   Sinus rhythm with 1st degree A-V block  Left ventricular hypertrophy with repolarization abnormality  Abnormal ECG  When compared with ECG of 09-AUG-2016 02:09,  CT interval has decreased  T wave inversion now evident in Inferior leads  T wave inversion now evident in Anterolateral leads     POC LACTIC ACID    Collection Time: 02/20/17  7:04 PM   Result Value Ref Range    Lactic Acid (POC) 7.1 (H) 0.5 - 1.9 mmol/L   CBC WITH AUTOMATED DIFF    Collection Time: 02/20/17  7:05 PM   Result Value Ref Range    WBC 17.3 (H) 4.3 - 11.1 K/uL    RBC 5.36 (H) 4.05 - 5.25 M/uL    HGB 16.8 (H) 11.7 - 15.4 g/dL    HCT 49.1 (H) 35.8 - 46.3 %    MCV 91.6 79.6 - 97.8 FL    MCH 31.3 26.1 - 32.9 PG    MCHC 34.2 31.4 - 35.0 g/dL    RDW 13.2 11.9 - 14.6 %    PLATELET 382 424 - 922 K/uL    MPV 11.0 10.8 - 14.1 FL    NEUTROPHILS 86 (H) 43 - 78 %    LYMPHOCYTES 9 (L) 13 - 44 %    MONOCYTES 5 4.0 - 12.0 %    EOSINOPHILS 0 (L) 0.5 - 7.8 %    BASOPHILS 0 0.0 - 2.0 %    ABS. NEUTROPHILS 14.9 (H) 1.7 - 8.2 K/UL    ABS. LYMPHOCYTES 1.6 0.5 - 4.6 K/UL    ABS. MONOCYTES 0.9 0.1 - 1.3 K/UL    ABS. EOSINOPHILS 0.0 0.0 - 0.8 K/UL    ABS. BASOPHILS 0.0 0.0 - 0.2 K/UL    ABS. IMM. GRANS. 0.2 0.0 - 0.5 K/UL    RBC COMMENTS NORMOCYTIC/NORMOCHROMIC      WBC COMMENTS Result Confirmed By Smear      PLATELET COMMENTS ADEQUATE      DF AUTOMATED     METABOLIC PANEL, COMPREHENSIVE    Collection Time: 02/20/17  7:05 PM   Result Value Ref Range    Sodium 132 (L) 136 - 145 mmol/L    Potassium 4.1 3.5 - 5.1 mmol/L    Chloride 90 (L) 98 - 107 mmol/L    CO2 26 21 - 32 mmol/L    Anion gap 16 7 - 16 mmol/L    Glucose 195 (H) 65 - 100 mg/dL    BUN 27 (H) 8 - 23 MG/DL    Creatinine 1.44 (H) 0.6 - 1.0 MG/DL    GFR est AA 44 (L) >60 ml/min/1.73m2    GFR est non-AA 36 (L) >60 ml/min/1.73m2    Calcium 9.2 8.3 - 10.4 MG/DL    Bilirubin, total 1.0 0.2 - 1.1 MG/DL    ALT (SGPT) 150 (H) 12 - 65 U/L    AST (SGOT) 338 (H) 15 - 37 U/L    Alk.  phosphatase 88 50 - 136 U/L    Protein, total 7.4 6.3 - 8.2 g/dL    Albumin 3.6 3.2 - 4.6 g/dL    Globulin 3.8 (H) 2.3 - 3.5 g/dL    A-G Ratio 0.9 (L) 1.2 - 3.5     PROTHROMBIN TIME + INR    Collection Time: 02/20/17  7:05 PM   Result Value Ref Range    Prothrombin time 12.5 (H) 9.6 - 12.0 sec    INR 1.1 0.9 - 1.2     MAGNESIUM    Collection Time: 02/20/17  7:05 PM   Result Value Ref Range    Magnesium 2.2 1.8 - 2.4 mg/dL   TROPONIN I    Collection Time: 02/20/17  7:05 PM   Result Value Ref Range    Troponin-I, Qt. 1.50 (HH) 0.02 - 0.05 NG/ML   PROCALCITONIN    Collection Time: 02/20/17  7:05 PM   Result Value Ref Range    Procalcitonin 0.3 ng/mL   BLOOD GAS, ARTERIAL    Collection Time: 02/20/17  7:10 PM   Result Value Ref Range    pH 7.35 7.35 - 7.45      PCO2 37 35.0 - 45.0 mmHg    PO2 175 (H) 75.0 - 100.0 mmHg    BICARBONATE 20 (L) 22.0 - 26.0 mmol/L    BASE DEFICIT 5.2 (H) 0 - 2 mmol/L    TOTAL HEMOGLOBIN 16.7 (H) 11.7 - 15.0 GM/DL    O2 SAT 99 (H) 92.0 - 98.5 %    ARTERIAL O2 HGB 97.6 (H) 94.0 - 97.0 %    CARBOXYHEMOGLOBIN 0.9 0.5 - 1.5 %    METHEMOGLOBIN 0.7 0.0 - 1.5 %    DEOXYHEMOGLOBIN 1 0.0 - 5.0 %    SITE RR     ALLENS TEST POSITIVE      MODE NRB     FIO2 100.0 %    Respiratory comment: Dr Michell Birch at 2 20 2017 7 29 19 PM. Read back. URINE MICROSCOPIC    Collection Time: 02/20/17  7:16 PM   Result Value Ref Range    WBC 10-20 0 /hpf    RBC 0-3 0 /hpf    Epithelial cells 3-5 0 /hpf    Bacteria 4+ (H) 0 /hpf    Casts 0 0 /lpf    Crystals 0 0 /LPF    Mucus 0 0 /lpf    Other observations RESULTS VERIFIED MANUALLY            This patient has SIRS + Infection and signs of tissue hypoperfusion, therefore they met criteria for severe sepsis. I have ordered labs and cultures, Abx 1 and Abx 2 have been given. Their initial lactate was 7.1  and RECHECK lactate after IVF bolus was still 3.3. Patient's blood pressure, mental status as well as respiratory status of all improved.   It appears that shock is resolving    My VOLUME STATUS and TISSUE PERFUSION assessment is documented below    ===================================================================  CARDIOPULMONARY ASSESSMENT (Lactate >4; Septic Shock)    Visit Vitals    /66    Pulse (!) 59    Temp 97.5 °F (36.4 °C)    Resp 20    Ht 5' 6\" (1.676 m)    Wt 77.1 kg (170 lb)    SpO2 99%    BMI 27.44 kg/m2       Cardiopulmonary Exam   --Lungs - Rhonchi noted, minimal wheezing  --Heart -  Normal Sinus Rhythm  Rate of 68    Capillary Refill - less than 2 seconds      Peripheral Pulses- Palpable    Skin Exam  -- normal, no cyanosis, jaundice, pallor or bruising    9:51 PM, 2/20/2017, Stephanie Epps MD  ===================================================================

## 2017-02-21 NOTE — H&P
HISTORY AND PHYSICAL      [facility] PH[facility]    2/20/2017    Date of Admission:  2/20/2017    The patient's chart is reviewed and the patient is discussed with the staff. Subjective:     Patient is a 80 y.o.  female presents after being found down at her senior care. Her daughter provides all history. She reportedly began feeling bad over the past few days with increased cough, sore throat, and fever. She did not report any dyspnea. She went to an  yesterday and was diagnosed with flu, started on tamiflu last night. Staff reportedly found her down about 6pm this evening. CPR was apparently started and EMS called, but on EMS arrival the patient was awake and conversant. She was started on O2 and brought to the ER. Her CXR was consistent with pna, she was hypoxic and is currently on optiflow. Troponin was elevated around 1. We have been asked to admit her. The patient remains confused above her baseline. The daughter states that she is DNR. Review of Systems  Unable to obtain due to the patient's condition. Patient Active Problem List   Diagnosis Code    GI bleeding K92.2    GI bleed K92.2    Acute respiratory failure with hypoxia (HCC) J96.01    Influenza J11.1    DNR (do not resuscitate) Z66    HCAP (healthcare-associated pneumonia) J18.9    Elevated troponin R79.89    ELDER (acute kidney injury) ([facility] [address] N17.9    Hyponatremia E87.1       Prior to Admission Medications   Prescriptions Last Dose Informant Patient Reported? Taking? DULoxetine (CYMBALTA) 30 mg capsule   Yes Yes   Sig: Take 30 mg by mouth daily. HYDROcodone-acetaminophen (NORCO) 5-325 mg per tablet   No Yes   Sig: Take 1 Tab by mouth every six (6) hours as needed for Pain. Max Daily Amount: 4 Tabs. Iron Fum & P-FA-Vit B & C No.9 (INTEGRA PLUS) 125-1 mg cap   Yes Yes   Sig: Take  by mouth. Iron Fum & PS Cmp-Vit C-Niacin 125-40-3 mg cap   Yes Yes   Sig: Take 1 Cap by mouth daily.    acetaminophen 500 mg cap Yes Yes   Sig: Take 1 Tab by mouth every four (4) hours as needed. amoxicillin (AMOXIL) 500 mg capsule   Yes Yes   Sig: Take 500 mg by mouth. aspirin 81 mg chewable tablet   Yes Yes   Sig: Take 81 mg by mouth daily. atenolol (TENORMIN) 100 mg tablet   Yes Yes   Sig: Take 100 mg by mouth daily. cholecalciferol, vitamin d3, 400 unit cap   Yes Yes   Sig: Take 1 Tab by mouth two (2) times a day. clorazepate (TRANXENE) 3.75 mg tablet   Yes Yes   Sig: Take 3.75 mg by mouth three (3) times daily. cranberry 500 mg capsule   Yes Yes   Sig: Take 1,000 mg by mouth daily. escitalopram oxalate (LEXAPRO) 20 mg tablet   Yes Yes   Sig: Take 20 mg by mouth daily (with dinner). fenofibrate nanocrystallized (TRICOR) 48 mg tablet   Yes Yes   Sig: Take 48 mg by mouth daily. ferrous sulfate 325 mg (65 mg iron) tablet   Yes Yes   Sig: Take  by mouth Daily (before breakfast). folic acid (FOLVITE) 1 mg tablet   Yes Yes   Sig: Take 1 mg by mouth daily. hydrochlorothiazide (HYDRODIURIL) 25 mg tablet   Yes Yes   Sig: Take 25 mg by mouth daily. loperamide (IMMODIUM) 2 mg tablet   Yes No   Sig: Take 2 mg by mouth four (4) times daily as needed for Diarrhea.   memantine (NAMENDA) 10 mg tablet   Yes Yes   Sig: Take 10 mg by mouth two (2) times a day. oseltamivir (TAMIFLU) 75 mg capsule   Yes Yes   Sig: Take 75 mg by mouth two (2) times a day. pregabalin (LYRICA) 50 mg capsule   Yes Yes   Sig: Take 50 mg by mouth three (3) times daily. sennosides 8.6 mg cap   Yes Yes   Sig: Take 1 Tab by mouth nightly as needed.       Facility-Administered Medications: None       Past Medical History   Diagnosis Date    Arthritis     Cancer ([address]      Colon    Hypertension     Ill-defined condition      MVP    Ill-defined condition      PVD    Ill-defined condition      Urinary retention    Neurological disorder     Psychiatric disorder      Anxiety/depression     Past Surgical History   Procedure Laterality Date    Hx gyn  Hx heent       Social History     Social History    Marital status:      Spouse name: N/A    Number of children: N/A    Years of education: N/A     Occupational History    Not on file. Social History Main Topics    Smoking status: Not on file    Smokeless tobacco: Not on file    Alcohol use No    Drug use: No    Sexual activity: Not on file     Other Topics Concern    Not on file     Social History Narrative     No family history on file. Allergies   Allergen Reactions    Neurontin [Gabapentin] Unknown (comments)       Current Facility-Administered Medications   Medication Dose Route Frequency    oseltamivir (TAMIFLU) capsule 30 mg  30 mg Oral NOW    0.9% sodium chloride infusion  100 mL/hr IntraVENous CONTINUOUS     Current Outpatient Prescriptions   Medication Sig    Iron Fum & P-FA-Vit B & C No.9 (INTEGRA PLUS) 125-1 mg cap Take  by mouth.  oseltamivir (TAMIFLU) 75 mg capsule Take 75 mg by mouth two (2) times a day.  amoxicillin (AMOXIL) 500 mg capsule Take 500 mg by mouth.  HYDROcodone-acetaminophen (NORCO) 5-325 mg per tablet Take 1 Tab by mouth every six (6) hours as needed for Pain. Max Daily Amount: 4 Tabs.  cranberry 500 mg capsule Take 1,000 mg by mouth daily.  DULoxetine (CYMBALTA) 30 mg capsule Take 30 mg by mouth daily.  escitalopram oxalate (LEXAPRO) 20 mg tablet Take 20 mg by mouth daily (with dinner).  fenofibrate nanocrystallized (TRICOR) 48 mg tablet Take 48 mg by mouth daily.  ferrous sulfate 325 mg (65 mg iron) tablet Take  by mouth Daily (before breakfast).  folic acid (FOLVITE) 1 mg tablet Take 1 mg by mouth daily.  hydrochlorothiazide (HYDRODIURIL) 25 mg tablet Take 25 mg by mouth daily.  Iron Fum & PS Cmp-Vit C-Niacin 125-40-3 mg cap Take 1 Cap by mouth daily.  pregabalin (LYRICA) 50 mg capsule Take 50 mg by mouth three (3) times daily.  memantine (NAMENDA) 10 mg tablet Take 10 mg by mouth two (2) times a day.     cholecalciferol, vitamin d3, 400 unit cap Take 1 Tab by mouth two (2) times a day.  clorazepate (TRANXENE) 3.75 mg tablet Take 3.75 mg by mouth three (3) times daily.  acetaminophen 500 mg cap Take 1 Tab by mouth every four (4) hours as needed.  sennosides 8.6 mg cap Take 1 Tab by mouth nightly as needed.  aspirin 81 mg chewable tablet Take 81 mg by mouth daily.  atenolol (TENORMIN) 100 mg tablet Take 100 mg by mouth daily.  loperamide (IMMODIUM) 2 mg tablet Take 2 mg by mouth four (4) times daily as needed for Diarrhea. Objective:     Vitals:    02/20/17 2032 02/20/17 2040 02/20/17 2042 02/20/17 2043   BP:    132/61   Pulse: 64 67 63 (!) 59   Resp: 23 21 21 20   Temp:       SpO2: 97% 100% 100% 100%   Weight:       Height:           PHYSICAL EXAM     Constitutional:  the patient is well developed and in no acute distress  EENMT:  Sclera clear, pupils equal, oral mucosa moist  Respiratory: B crackles. Cardiovascular:  RRR without M,G,R  Gastrointestinal: soft and non-tender; with positive bowel sounds. Musculoskeletal: warm without cyanosis. There is no lower leg edema. Skin:  no jaundice or rashes, no wounds   Neurologic: no gross neuro deficits     Psychiatric:  alert and oriented x person    Chest Xray:  2/20/17  1. New retrocardiac opacities which appear to represent airspace changes and a  small effusion. Symptoms of pneumonia should be excluded given the unilateral  distribution. Less likely, this may represent an atypical presentation of heart  failure given the associated cardiomegaly. Recommend clinical correlation.       Recent Labs      02/20/17 1905   WBC  17.3*   HGB  16.8*   HCT  49.1*   PLT  241   INR  1.1     Recent Labs      02/20/17 1905   NA  132*   K  4.1   CL  90*   GLU  195*   CO2  26   BUN  27*   CREA  1.44*   MG  2.2   CA  9.2   TROIQ  1.50*   ALB  3.6   TBILI  1.0   ALT  150*   SGOT  338*     Recent Labs      02/20/17 1910   PH  7.35   PCO2  37   PO2  175* HCO3  20*     No results for input(s): LCAD, LAC in the last 72 hours. Assessment:  (Medical Decision Making)     Hospital Problems  Date Reviewed: 1/13/2017          Codes Class Noted POA    Acute respiratory failure with hypoxia [facility]) ICD-10-CM: J96.01  ICD-9-CM: 518.81  2/20/2017 Unknown        * (Principal)Influenza ICD-10-CM: J11.1  ICD-9-CM: 487.1  2/20/2017 Unknown        DNR (do not resuscitate) ICD-10-CM: Z66  ICD-9-CM: V49.86  2/20/2017 Unknown        HCAP (healthcare-associated pneumonia) ICD-10-CM: J18.9  ICD-9-CM: 486  2/20/2017 Unknown        Elevated troponin ICD-10-CM: R79.89  ICD-9-CM: 790.6  2/20/2017 Unknown        ELDER (acute kidney injury) ([facility] [address] ICD-10-CM: N17.9  ICD-9-CM: 584.9  2/20/2017 Unknown        Hyponatremia ICD-10-CM: E87.1  ICD-9-CM: 276.1  2/20/2017 Unknown            Elderly patient with diagnosis yesterday of flu, found down at assisted living facility. Questionable whether she truly had cardiac arrest. CPR given but awake and conversant on EMS arrival. At least has flu, may also have bacterial pna. Given age feel must treat both. Also with ELDER, elevated troponin but no symptoms of MI. Plan:  (Medical Decision Making)     --Will admit for further medical management  --Supplemental O2   --culture blood and sputum. --antibiotic therapy: vanc zosyn for hcap  --tamiflu  --droplet precautions. --IVF for both ELDER and hyponatremia. Daily BMP to monitor. --will repeat troponin in 6 hours. Only start heparin gtt if trending up and symptomatic.  --DNR    More than 50% of the time documented was spent in face-to-face contact with the patient and in the care of the patient on the floor/unit where the patient is located.     Asya Carballo MD

## 2017-02-21 NOTE — PROGRESS NOTES
Pharmacokinetic Consult to Pharmacist    [address] is a 80 y.o. female being treated for HCAP with vancomycin and zosyn. Also flu positive    Ht Readings from Last 1 Encounters:   02/20/17 5' 6\" (1.676 m)      Wt Readings from Last 1 Encounters:   02/20/17 77.1 kg (170 lb)       Lab Results   Component Value Date/Time    BUN 27 02/20/2017 07:05 PM    Creatinine 1.44 02/20/2017 07:05 PM    WBC 17.3 02/20/2017 07:05 PM    Procalcitonin 0.3 02/20/2017 07:05 PM      Estimated Creatinine Clearance: 27.2 mL/min (based on Cr of 1.44). CULTURES:  pending. Day 1 of vancomycin. Goal trough is 15-20. Vancomycin dose initiated at 1250 mg q 24h. Will continue to follow patient.       Thank you,  Gato Brady, PharmD  Clinical Pharmacist

## 2017-02-21 NOTE — PROGRESS NOTES
TRANSFER - IN REPORT:    Verbal report received from [address] [facility] on [address] [facility]  being received from ED for routine progression of care      Report consisted of patients Situation, Background, Assessment and   Recommendations(SBAR). Information from the following report(s) SBAR, Kardex, ED Summary, [facility] and Recent Results was reviewed with the receiving nurse. Opportunity for questions and clarification was provided. Assessment completed upon patients arrival to unit and care assumed. Not primary RN. Verbal report given to primary RN Jena Peterson RN.

## 2017-02-21 NOTE — PROGRESS NOTES
[address] [facility]  Admission Date: 2/20/2017             Daily Progress Note: 2/21/2017    The patient's chart is reviewed and the patient is discussed with the staff. Pt is a 79 yo  female with a history of dementia, hyperlipidemia, and HTN. Pt presented to the ER on 2/20 after being found down at [facility] after being diagnosed with flu earlier in the day. CPR was started and when EMS arrived pt awake and conversant. She had CXR consistent with PNA. Pt admitted and started on IV abx for HCAP and continued on Tamiflu. Pt is DNR. Subjective:     Pt lying in bed on 2L O2. Pt asleep but awakes easily. She denies cough or shortness of breath. Pt is alert only to person. She is upset because she doesn't know how to use the phone and call her daughter. She was reassured and stated that she would go back to sleep now.      Current Facility-Administered Medications   Medication Dose Route Frequency    acetaminophen (TYLENOL) tablet 650 mg  650 mg Oral Q6H PRN    aspirin chewable tablet 81 mg  81 mg Oral DAILY    atenolol (TENORMIN) tablet 100 mg  100 mg Oral DAILY    clorazepate (TRANXENE) tablet 3.75 mg  3.75 mg Oral TID    DULoxetine (CYMBALTA) capsule 30 mg  30 mg Oral DAILY    escitalopram oxalate (LEXAPRO) tablet 20 mg  20 mg Oral DAILY WITH DINNER    fenofibrate (LOFIBRA) tablet 54 mg  54 mg Oral DAILY    ferrous sulfate tablet 325 mg  1 Tab Oral DAILY WITH BREAKFAST    folic acid (FOLVITE) tablet 1 mg  1 mg Oral DAILY    hydroCHLOROthiazide (HYDRODIURIL) tablet 25 mg  25 mg Oral DAILY    memantine (NAMENDA) tablet 10 mg  10 mg Oral BID    [START ON 2/22/2017] oseltamivir (TAMIFLU) capsule 30 mg  30 mg Oral QHS    pregabalin (LYRICA) capsule 50 mg  50 mg Oral TID    senna (SENOKOT) tablet 8.6 mg  1 Tab Oral QHS    sodium chloride (NS) flush 5-10 mL  5-10 mL IntraVENous Q8H    sodium chloride (NS) flush 5-10 mL  5-10 mL IntraVENous PRN    piperacillin-tazobactam (ZOSYN) 4.5 g in 0.9% sodium chloride (MBP/ADV) 100 mL  4.5 g IntraVENous Q12H    enoxaparin (LOVENOX) injection 30 mg  30 mg SubCUTAneous Q24H    0.9% sodium chloride infusion  100 mL/hr IntraVENous CONTINUOUS    vancomycin (VANCOCIN) 1250 mg in  ml infusion  1,250 mg IntraVENous Q24H       Review of Systems   Unobtainable due to patient status. Objective:     Vitals:    02/20/17 2310 02/21/17 0133 02/21/17 0300 02/21/17 0801   BP: 139/71  133/73 (!) 109/91   Pulse: 72  62 90   Resp: 18  18 18   Temp: 97.6 °F (36.4 °C)  97.6 °F (36.4 °C) 97.7 °F (36.5 °C)   SpO2: 93% 97% 97% 97%   Weight:       Height:         Intake and Output:           Physical Exam:   Constitution:  the patient is well developed and in no acute distress, on 2L  O2  EENMT:  Sclera clear, pupils equal, oral mucosa moist  Respiratory: coarse breath sounds  Cardiovascular:  RRR without M,G,R  Gastrointestinal: soft and non-tender; with positive bowel sounds. Musculoskeletal: warm without cyanosis. There is trace lower leg edema. Skin:  no jaundice or rashes   Neurologic: no gross neuro deficits     Psychiatric:  Awake, oriented only to person    CHEST XRAY:       LAB  No results for input(s): GLUCPOC in the last 72 hours. No lab exists for component: [place]   Recent Labs      02/21/17 0135 02/20/17   1905   WBC  14.0*  17.3*   HGB  15.3  16.8*   HCT  44.3  49.1*   PLT  157  241   INR   --   1.1     Recent Labs      02/21/17   0647  02/21/17   0135 02/20/17   1905   NA   --   137  132*   K   --   3.2*  4.1   CL   --   98  90*   CO2   --   27  26   GLU   --   160*  195*   BUN   --   29*  27*   CREA   --   0.97  1.44*   MG   --    --   2.2   CA   --   8.1*  9.2   TROIQ  2.57*  2.56*  1.50*   ALB   --    --   3.6   TBILI   --    --   1.0   ALT   --    --   150*   SGOT   --    --   338*     Recent Labs      02/20/17 1910   PH  7.35   PCO2  37   PO2  175*   HCO3  20*     No results for input(s): LCAD, LAC in the last 72 hours.       Assessment: (Medical Decision Making)     Hospital Problems  Date Reviewed: 2/21/2017          Codes Class Noted POA    Acute respiratory failure with hypoxia (HCC)-wean O2 as tolerated ICD-10-CM: J96.01  ICD-9-CM: 518.81  2/20/2017 Unknown        * (Principal)Influenza-continue tamiflu ICD-10-CM: J11.1  ICD-9-CM: 487.1  2/20/2017 Unknown        DNR (do not resuscitate)-chronic ICD-10-CM: Z66  ICD-9-CM: V49.86  2/20/2017 Unknown        HCAP (healthcare-associated pneumonia)-continue zosyn/vanc ICD-10-CM: J18.9  ICD-9-CM: 498  2/20/2017 Unknown        Elevated troponin-check echocardiogram, likely not candidate for any intervention ICD-10-CM: R79.89  ICD-9-CM: 790.6  2/20/2017 Unknown        ELDER (acute kidney injury) (HCC)-improved today ICD-10-CM: N17.9  ICD-9-CM: 584.9  2/20/2017 Unknown        Hyponatremia-improved today ICD-10-CM: E87.1  ICD-9-CM: 276.1  2/20/2017 Unknown              Plan:  (Medical Decision Making)     --wean O2 as tolerated  --continue Vanc/Zosyn-day 2  --urine cx: >100,000 GNR, pending  --blood cx x 2: NGTD  --continue tamiflu  --check echocardiogram  --consider discontinuing fluids soon    More than 50% of the time documented was spent in face-to-face contact with the patient and in the care of the patient on the floor/unit where the patient is located. ALIVIA Holcomb          Lungs: Crackles   Heart:  RRR with no Murmur/Rubs/Gallops    Additional Comments:  Stop iv fluids, check echo     I have spoken with and examined the patient. I agree with the above assessment and plan as documented.     Vandana Helms MD

## 2017-02-22 PROBLEM — E87.1 HYPONATREMIA: Status: RESOLVED | Noted: 2017-02-20 | Resolved: 2017-02-22

## 2017-02-22 PROBLEM — N17.9 AKI (ACUTE KIDNEY INJURY) (HCC): Status: RESOLVED | Noted: 2017-02-20 | Resolved: 2017-02-22

## 2017-02-22 LAB
ANION GAP BLD CALC-SCNC: 11 MMOL/L (ref 7–16)
BACTERIA SPEC CULT: ABNORMAL
BUN SERPL-MCNC: 25 MG/DL (ref 8–23)
CALCIUM SERPL-MCNC: 8.1 MG/DL (ref 8.3–10.4)
CHLORIDE SERPL-SCNC: 101 MMOL/L (ref 98–107)
CO2 SERPL-SCNC: 28 MMOL/L (ref 21–32)
CREAT SERPL-MCNC: 0.69 MG/DL (ref 0.6–1)
CREAT SERPL-MCNC: 0.73 MG/DL (ref 0.6–1)
ERYTHROCYTE [DISTWIDTH] IN BLOOD BY AUTOMATED COUNT: 13.2 % (ref 11.9–14.6)
GLUCOSE SERPL-MCNC: 99 MG/DL (ref 65–100)
HCT VFR BLD AUTO: 40.4 % (ref 35.8–46.3)
HGB BLD-MCNC: 13.7 G/DL (ref 11.7–15.4)
MCH RBC QN AUTO: 30.4 PG (ref 26.1–32.9)
MCHC RBC AUTO-ENTMCNC: 33.9 G/DL (ref 31.4–35)
MCV RBC AUTO: 89.6 FL (ref 79.6–97.8)
PLATELET # BLD AUTO: 169 K/UL (ref 150–450)
PMV BLD AUTO: 10.8 FL (ref 10.8–14.1)
POTASSIUM SERPL-SCNC: 3.1 MMOL/L (ref 3.5–5.1)
RBC # BLD AUTO: 4.51 M/UL (ref 4.05–5.25)
SERVICE CMNT-IMP: ABNORMAL
SODIUM SERPL-SCNC: 140 MMOL/L (ref 136–145)
TROPONIN I SERPL-MCNC: 2.33 NG/ML (ref 0.02–0.05)
TROPONIN I SERPL-MCNC: 2.55 NG/ML (ref 0.02–0.05)
TROPONIN I SERPL-MCNC: 2.81 NG/ML (ref 0.02–0.05)
TROPONIN I SERPL-MCNC: 2.83 NG/ML (ref 0.02–0.05)
VANCOMYCIN TROUGH SERPL-MCNC: 10.1 UG/ML (ref 5–20)
WBC # BLD AUTO: 11.4 K/UL (ref 4.3–11.1)

## 2017-02-22 PROCEDURE — 80202 ASSAY OF VANCOMYCIN: CPT | Performed by: INTERNAL MEDICINE

## 2017-02-22 PROCEDURE — 65270000029 HC RM PRIVATE

## 2017-02-22 PROCEDURE — 74011250637 HC RX REV CODE- 250/637: Performed by: NURSE PRACTITIONER

## 2017-02-22 PROCEDURE — 99232 SBSQ HOSP IP/OBS MODERATE 35: CPT | Performed by: INTERNAL MEDICINE

## 2017-02-22 PROCEDURE — 80048 BASIC METABOLIC PNL TOTAL CA: CPT | Performed by: INTERNAL MEDICINE

## 2017-02-22 PROCEDURE — 74011250636 HC RX REV CODE- 250/636: Performed by: INTERNAL MEDICINE

## 2017-02-22 PROCEDURE — 84484 ASSAY OF TROPONIN QUANT: CPT | Performed by: INTERNAL MEDICINE

## 2017-02-22 PROCEDURE — 85027 COMPLETE CBC AUTOMATED: CPT | Performed by: INTERNAL MEDICINE

## 2017-02-22 PROCEDURE — 36415 COLL VENOUS BLD VENIPUNCTURE: CPT | Performed by: INTERNAL MEDICINE

## 2017-02-22 PROCEDURE — 74011000258 HC RX REV CODE- 258: Performed by: INTERNAL MEDICINE

## 2017-02-22 PROCEDURE — 74011250637 HC RX REV CODE- 250/637: Performed by: INTERNAL MEDICINE

## 2017-02-22 RX ORDER — POTASSIUM CHLORIDE 20 MEQ/1
40 TABLET, EXTENDED RELEASE ORAL
Status: COMPLETED | OUTPATIENT
Start: 2017-02-22 | End: 2017-02-22

## 2017-02-22 RX ADMIN — FOLIC ACID 1 MG: 1 TABLET ORAL at 08:22

## 2017-02-22 RX ADMIN — CLORAZEPATE DIPOTASSIUM 3.75 MG: 7.5 TABLET ORAL at 08:21

## 2017-02-22 RX ADMIN — PREGABALIN 50 MG: 50 CAPSULE ORAL at 22:23

## 2017-02-22 RX ADMIN — ACETAMINOPHEN 650 MG: 325 TABLET, FILM COATED ORAL at 15:04

## 2017-02-22 RX ADMIN — DULOXETINE HYDROCHLORIDE 30 MG: 30 CAPSULE, DELAYED RELEASE ORAL at 08:24

## 2017-02-22 RX ADMIN — PIPERACILLIN SODIUM,TAZOBACTAM SODIUM 4.5 G: 4; .5 INJECTION, POWDER, FOR SOLUTION INTRAVENOUS at 05:55

## 2017-02-22 RX ADMIN — MEMANTINE HYDROCHLORIDE 10 MG: 5 TABLET ORAL at 17:26

## 2017-02-22 RX ADMIN — PIPERACILLIN SODIUM,TAZOBACTAM SODIUM 4.5 G: 4; .5 INJECTION, POWDER, FOR SOLUTION INTRAVENOUS at 22:23

## 2017-02-22 RX ADMIN — PREGABALIN 50 MG: 50 CAPSULE ORAL at 08:23

## 2017-02-22 RX ADMIN — FERROUS SULFATE TAB 325 MG (65 MG ELEMENTAL FE) 325 MG: 325 (65 FE) TAB at 08:22

## 2017-02-22 RX ADMIN — PREGABALIN 50 MG: 50 CAPSULE ORAL at 16:25

## 2017-02-22 RX ADMIN — PIPERACILLIN SODIUM,TAZOBACTAM SODIUM 4.5 G: 4; .5 INJECTION, POWDER, FOR SOLUTION INTRAVENOUS at 15:03

## 2017-02-22 RX ADMIN — HYDROCHLOROTHIAZIDE 25 MG: 25 TABLET ORAL at 08:22

## 2017-02-22 RX ADMIN — SENNOSIDES 8.6 MG: 8.6 TABLET, FILM COATED ORAL at 22:23

## 2017-02-22 RX ADMIN — POTASSIUM CHLORIDE 40 MEQ: 20 TABLET, EXTENDED RELEASE ORAL at 16:25

## 2017-02-22 RX ADMIN — MEMANTINE HYDROCHLORIDE 10 MG: 5 TABLET ORAL at 08:22

## 2017-02-22 RX ADMIN — OSELTAMIVIR PHOSPHATE 30 MG: 30 CAPSULE ORAL at 08:23

## 2017-02-22 RX ADMIN — OSELTAMIVIR PHOSPHATE 30 MG: 30 CAPSULE ORAL at 22:23

## 2017-02-22 RX ADMIN — ASPIRIN 81 MG 81 MG: 81 TABLET ORAL at 08:22

## 2017-02-22 RX ADMIN — ENOXAPARIN SODIUM 40 MG: 40 INJECTION SUBCUTANEOUS at 08:25

## 2017-02-22 RX ADMIN — Medication 5 ML: at 15:04

## 2017-02-22 RX ADMIN — FENOFIBRATE 54 MG: 54 TABLET ORAL at 08:23

## 2017-02-22 RX ADMIN — ATENOLOL 100 MG: 50 TABLET ORAL at 08:24

## 2017-02-22 RX ADMIN — VANCOMYCIN HYDROCHLORIDE 1250 MG: 10 INJECTION, POWDER, LYOPHILIZED, FOR SOLUTION INTRAVENOUS at 22:23

## 2017-02-22 RX ADMIN — ESCITALOPRAM OXALATE 20 MG: 10 TABLET ORAL at 16:25

## 2017-02-22 NOTE — PROGRESS NOTES
Spoke with daughter Dafne Saldana, provided code, informed patient is doing better, PT has been ordered for her. Her lab work is still being monitor closely, no d/c orders a this time.

## 2017-02-22 NOTE — PROGRESS NOTES
[address] [facility]  Admission Date: 2/20/2017             Daily Progress Note: 2/22/2017    The patient's chart is reviewed and the patient is discussed with the staff. 80 y.o. presents after being found down at her [facility]. Her daughter provides all history. She reportedly feeling bad for a few days with cough, sore throat, and fever. She did not report dyspnea. Went to an  [phone] 2/19/17, was diagnosed with flu, started on Tamiflu last night and was found down. CPR was started, EMS called, but on EMS arrival she was awake and conversant. Was started on O2 and brought to the ER. CXR consistent with pneumonia, she was hypoxic and placed on Opti-flow. Troponin was elevated around 1. She was admitted, remained confused and DNR status confirmed. Subjective:     Lying in bed on NC 5L. Oriented x2 stating she is in the hospital and wants to know \"how long I need to be here for the flu\". States she is not on home O2.     Current Facility-Administered Medications   Medication Dose Route Frequency    Vancomycin Trough reminder  1 Each Other ONCE    potassium chloride (K-DUR, KLOR-CON) SR tablet 40 mEq  40 mEq Oral NOW    enoxaparin (LOVENOX) injection 40 mg  40 mg SubCUTAneous Q24H    oseltamivir (TAMIFLU) capsule 30 mg  30 mg Oral Q12H    piperacillin-tazobactam (ZOSYN) 4.5 g in 0.9% sodium chloride (MBP/ADV) 100 mL  4.5 g IntraVENous Q8H    acetaminophen (TYLENOL) tablet 650 mg  650 mg Oral Q6H PRN    aspirin chewable tablet 81 mg  81 mg Oral DAILY    atenolol (TENORMIN) tablet 100 mg  100 mg Oral DAILY    clorazepate (TRANXENE) tablet 3.75 mg  3.75 mg Oral TID    DULoxetine (CYMBALTA) capsule 30 mg  30 mg Oral DAILY    escitalopram oxalate (LEXAPRO) tablet 20 mg  20 mg Oral DAILY WITH DINNER    fenofibrate (LOFIBRA) tablet 54 mg  54 mg Oral DAILY    ferrous sulfate tablet 325 mg  1 Tab Oral DAILY WITH BREAKFAST    folic acid (FOLVITE) tablet 1 mg  1 mg Oral DAILY    hydroCHLOROthiazide (HYDRODIURIL) tablet 25 mg  25 mg Oral DAILY    memantine (NAMENDA) tablet 10 mg  10 mg Oral BID    pregabalin (LYRICA) capsule 50 mg  50 mg Oral TID    senna (SENOKOT) tablet 8.6 mg  1 Tab Oral QHS    sodium chloride (NS) flush 5-10 mL  5-10 mL IntraVENous Q8H    sodium chloride (NS) flush 5-10 mL  5-10 mL IntraVENous PRN    vancomycin (VANCOCIN) 1250 mg in  ml infusion  1,250 mg IntraVENous Q24H       Review of Systems  Constitutional: negative for fever, chills, sweats  Cardiovascular: negative for chest pain, palpitations, syncope, edema  Gastrointestinal:  negative for dysphagia, reflux, vomiting, diarrhea, abdominal pain, or melena  Neurologic:  negative for focal weakness, numbness, headache    Objective:     Vitals:    02/22/17 0119 02/22/17 0533 02/22/17 0814 02/22/17 1058   BP: 138/81 152/82 141/73 138/80   Pulse: 74 73 (!) 58 62   Resp: 18 16 16 18   Temp: 98.8 °F (37.1 °C) 98.9 °F (37.2 °C) 98.3 °F (36.8 °C) 98.4 °F (36.9 °C)   SpO2: 93% 99% 98% 97%   Weight:       Height:         Intake and Output:           Physical Exam:   Constitution:  the patient is well developed, elderly and in no acute distress, NC 5L, sat 97%  EENMT:  Sclera clear, pupils equal, oral mucosa moist  Respiratory: few anterior crackles, no wheezing  Cardiovascular:  RRR without M,G,R  Gastrointestinal: soft and non-tender; with positive bowel sounds. Musculoskeletal: warm without cyanosis. There is no lower leg edema. Skin:  no jaundice or rashes, no wounds   Neurologic: no gross neuro deficits     Psychiatric:  alert and oriented x 2    ECHO:  -  Left ventricle: Systolic function was mildly reduced. EF 45 % to 50 %. There was mild hypokinesis of the mid  anteroseptal wall(s). There was mild hypokinesis of the mid anterolateral wall(s). Wall thickness was mildly increased. Doppler parameters were consistent with mild diastolic dysfunction (grade 1). -  Right ventricle:  There was mild pulmonary artery hypertension.  -  Aortic valve: There was mild regurgitation. -  Mitral valve: There was mild regurgitation. -  Tricuspid valve: There was mild regurgitation. -  Pulmonic valve: There was mild to moderate regurgitation. CHEST XRAY: None today    CHEST XRAY 2/20/17:        LAB  No results for input(s): GLUCPOC in the last 72 hours. No lab exists for component: [place]   Recent Labs      02/22/17   0615  02/21/17   0135  02/20/17   1905   WBC  11.4*  14.0*  17.3*   HGB  13.7  15.3  16.8*   HCT  40.4  44.3  49.1*   PLT  169  157  241   INR   --    --   1.1     Recent Labs      02/22/17   1417  02/22/17   0615  02/22/17   0040   02/21/17   0135  02/20/17   1905   NA   --   140   --    --   137  132*   K   --   3.1*   --    --   3.2*  4.1   CL   --   101   --    --   98  90*   CO2   --   28   --    --   27  26   GLU   --   99   --    --   160*  195*   BUN   --   25*   --    --   29*  27*   CREA   --   0.73   --    --   0.97  1.44*   MG   --    --    --    --    --   2.2   CA   --   8.1*   --    --   8.1*  9.2   TROIQ  2.83*  2.81*  2.55*   < >  2.56*  1.50*   ALB   --    --    --    --    --   3.6   TBILI   --    --    --    --    --   1.0   ALT   --    --    --    --    --   150*   SGOT   --    --    --    --    --   338*    < > = values in this interval not displayed. Recent Labs      02/20/17   1910   PH  7.35   PCO2  37   PO2  175*   HCO3  20*     No results for input(s): LCAD, LAC in the last 72 hours.       Assessment:  (Medical Decision Making)     Hospital Problems  Date Reviewed: 2/22/2017          Codes Class Noted POA    Acute respiratory failure with hypoxia [facility]) ICD-10-CM: J96.01  ICD-9-CM: 518.81  2/20/2017 Yes    Wean O2 as tolerated    * (Principal)Influenza ICD-10-CM: J11.1  ICD-9-CM: 487.1  2/20/2017 Yes    Continue Tamiflu    DNR (do not resuscitate) ICD-10-CM: Z66  ICD-9-CM: V49.86  2/20/2017 Yes    unchanged    HCAP (healthcare-associated pneumonia) ICD-10-CM: J18.9  ICD-9-CM: 316  2/20/2017 Yes continue antibiotics    Elevated troponin ICD-10-CM: R79.89  ICD-9-CM: 790.6  2/20/2017 Yes    Still trending up--2.93 now          Plan:  (Medical Decision Making)     --Tamiflu dose # 4  --Zosyn, Vancomycin day 3  --Blood cultures:  No growth 2 days-pending  --Urine culture:  >100,000 E. Coli-sensitive to Zosyn  --K+ 3.1 --supplement and follow lab in AM  --PT for mobility--OOB with assistance  --Wean O2 as tolerated  --Troponin up to 2.83--no chest pain. Unknown etiology? Possible mismatch? Consider cardiology consult. More than 50% of the time documented was spent in face-to-face contact with the patient and in the care of the patient on the floor/unit where the patient is located. Jennifer Benedict NP      Lungs: Decreased BS in the bases  Heart:  RRR with no Murmur/Rubs/Gallops    Additional Comments:  Con. ABX and Tamiflu. The elevated troponin is from supply demand mismatching    I have spoken with and examined the patient. I agree with the above assessment and plan as documented.     Gala Alcantar MD

## 2017-02-23 LAB
ANION GAP BLD CALC-SCNC: 10 MMOL/L (ref 7–16)
BUN SERPL-MCNC: 17 MG/DL (ref 8–23)
CALCIUM SERPL-MCNC: 8.3 MG/DL (ref 8.3–10.4)
CHLORIDE SERPL-SCNC: 100 MMOL/L (ref 98–107)
CO2 SERPL-SCNC: 31 MMOL/L (ref 21–32)
CREAT SERPL-MCNC: 0.5 MG/DL (ref 0.6–1)
GLUCOSE SERPL-MCNC: 91 MG/DL (ref 65–100)
MAGNESIUM SERPL-MCNC: 1.8 MG/DL (ref 1.8–2.4)
POTASSIUM SERPL-SCNC: 3 MMOL/L (ref 3.5–5.1)
SODIUM SERPL-SCNC: 141 MMOL/L (ref 136–145)
TROPONIN I SERPL-MCNC: 0.68 NG/ML (ref 0.02–0.05)
TROPONIN I SERPL-MCNC: 1.32 NG/ML (ref 0.02–0.05)
TROPONIN I SERPL-MCNC: 1.6 NG/ML (ref 0.02–0.05)

## 2017-02-23 PROCEDURE — 74011250637 HC RX REV CODE- 250/637: Performed by: INTERNAL MEDICINE

## 2017-02-23 PROCEDURE — 83735 ASSAY OF MAGNESIUM: CPT | Performed by: INTERNAL MEDICINE

## 2017-02-23 PROCEDURE — 97162 PT EVAL MOD COMPLEX 30 MIN: CPT

## 2017-02-23 PROCEDURE — 74011250636 HC RX REV CODE- 250/636: Performed by: INTERNAL MEDICINE

## 2017-02-23 PROCEDURE — 94760 N-INVAS EAR/PLS OXIMETRY 1: CPT

## 2017-02-23 PROCEDURE — 80048 BASIC METABOLIC PNL TOTAL CA: CPT | Performed by: NURSE PRACTITIONER

## 2017-02-23 PROCEDURE — 74011000250 HC RX REV CODE- 250: Performed by: INTERNAL MEDICINE

## 2017-02-23 PROCEDURE — 84484 ASSAY OF TROPONIN QUANT: CPT | Performed by: INTERNAL MEDICINE

## 2017-02-23 PROCEDURE — 74011000258 HC RX REV CODE- 258: Performed by: INTERNAL MEDICINE

## 2017-02-23 PROCEDURE — 99232 SBSQ HOSP IP/OBS MODERATE 35: CPT | Performed by: INTERNAL MEDICINE

## 2017-02-23 PROCEDURE — 94640 AIRWAY INHALATION TREATMENT: CPT

## 2017-02-23 PROCEDURE — 36415 COLL VENOUS BLD VENIPUNCTURE: CPT | Performed by: INTERNAL MEDICINE

## 2017-02-23 PROCEDURE — 65270000029 HC RM PRIVATE

## 2017-02-23 RX ORDER — ALBUTEROL SULFATE 0.83 MG/ML
2.5 SOLUTION RESPIRATORY (INHALATION)
Status: DISCONTINUED | OUTPATIENT
Start: 2017-02-23 | End: 2017-02-25

## 2017-02-23 RX ORDER — MAGNESIUM SULFATE HEPTAHYDRATE 40 MG/ML
2 INJECTION, SOLUTION INTRAVENOUS ONCE
Status: COMPLETED | OUTPATIENT
Start: 2017-02-23 | End: 2017-02-23

## 2017-02-23 RX ORDER — POTASSIUM CHLORIDE 20 MEQ/1
40 TABLET, EXTENDED RELEASE ORAL EVERY 4 HOURS
Status: COMPLETED | OUTPATIENT
Start: 2017-02-23 | End: 2017-02-23

## 2017-02-23 RX ORDER — GUAIFENESIN 600 MG/1
1200 TABLET, EXTENDED RELEASE ORAL EVERY 12 HOURS
Status: DISCONTINUED | OUTPATIENT
Start: 2017-02-23 | End: 2017-03-01 | Stop reason: HOSPADM

## 2017-02-23 RX ADMIN — SENNOSIDES 8.6 MG: 8.6 TABLET, FILM COATED ORAL at 21:44

## 2017-02-23 RX ADMIN — OSELTAMIVIR PHOSPHATE 30 MG: 30 CAPSULE ORAL at 13:03

## 2017-02-23 RX ADMIN — FERROUS SULFATE TAB 325 MG (65 MG ELEMENTAL FE) 325 MG: 325 (65 FE) TAB at 10:57

## 2017-02-23 RX ADMIN — CLORAZEPATE DIPOTASSIUM 3.75 MG: 7.5 TABLET ORAL at 17:08

## 2017-02-23 RX ADMIN — ESCITALOPRAM OXALATE 20 MG: 10 TABLET ORAL at 17:08

## 2017-02-23 RX ADMIN — FENOFIBRATE 54 MG: 54 TABLET ORAL at 11:00

## 2017-02-23 RX ADMIN — CLORAZEPATE DIPOTASSIUM 3.75 MG: 7.5 TABLET ORAL at 21:44

## 2017-02-23 RX ADMIN — PIPERACILLIN SODIUM,TAZOBACTAM SODIUM 4.5 G: 4; .5 INJECTION, POWDER, FOR SOLUTION INTRAVENOUS at 05:12

## 2017-02-23 RX ADMIN — Medication 10 ML: at 21:45

## 2017-02-23 RX ADMIN — GUAIFENESIN 1200 MG: 600 TABLET, EXTENDED RELEASE ORAL at 21:44

## 2017-02-23 RX ADMIN — MEMANTINE HYDROCHLORIDE 10 MG: 5 TABLET ORAL at 11:07

## 2017-02-23 RX ADMIN — POTASSIUM CHLORIDE 40 MEQ: 20 TABLET, EXTENDED RELEASE ORAL at 13:03

## 2017-02-23 RX ADMIN — ALBUTEROL SULFATE 2.5 MG: 2.5 SOLUTION RESPIRATORY (INHALATION) at 12:12

## 2017-02-23 RX ADMIN — MEMANTINE HYDROCHLORIDE 10 MG: 5 TABLET ORAL at 17:09

## 2017-02-23 RX ADMIN — FOLIC ACID 1 MG: 1 TABLET ORAL at 10:57

## 2017-02-23 RX ADMIN — Medication 5 ML: at 13:03

## 2017-02-23 RX ADMIN — POTASSIUM CHLORIDE 40 MEQ: 20 TABLET, EXTENDED RELEASE ORAL at 17:09

## 2017-02-23 RX ADMIN — PREGABALIN 50 MG: 50 CAPSULE ORAL at 17:09

## 2017-02-23 RX ADMIN — CLORAZEPATE DIPOTASSIUM 3.75 MG: 7.5 TABLET ORAL at 10:57

## 2017-02-23 RX ADMIN — MAGNESIUM SULFATE HEPTAHYDRATE 2 G: 40 INJECTION, SOLUTION INTRAVENOUS at 19:42

## 2017-02-23 RX ADMIN — GUAIFENESIN 1200 MG: 600 TABLET, EXTENDED RELEASE ORAL at 13:03

## 2017-02-23 RX ADMIN — HYDROCHLOROTHIAZIDE 25 MG: 25 TABLET ORAL at 11:00

## 2017-02-23 RX ADMIN — PIPERACILLIN SODIUM,TAZOBACTAM SODIUM 4.5 G: 4; .5 INJECTION, POWDER, FOR SOLUTION INTRAVENOUS at 13:02

## 2017-02-23 RX ADMIN — PREGABALIN 50 MG: 50 CAPSULE ORAL at 21:44

## 2017-02-23 RX ADMIN — OSELTAMIVIR PHOSPHATE 30 MG: 30 CAPSULE ORAL at 21:44

## 2017-02-23 RX ADMIN — PIPERACILLIN SODIUM,TAZOBACTAM SODIUM 4.5 G: 4; .5 INJECTION, POWDER, FOR SOLUTION INTRAVENOUS at 21:43

## 2017-02-23 RX ADMIN — ENOXAPARIN SODIUM 40 MG: 40 INJECTION SUBCUTANEOUS at 10:56

## 2017-02-23 RX ADMIN — ASPIRIN 81 MG 81 MG: 81 TABLET ORAL at 10:57

## 2017-02-23 RX ADMIN — ATENOLOL 100 MG: 50 TABLET ORAL at 10:57

## 2017-02-23 RX ADMIN — DULOXETINE HYDROCHLORIDE 30 MG: 30 CAPSULE, DELAYED RELEASE ORAL at 10:57

## 2017-02-23 RX ADMIN — ALBUTEROL SULFATE 2.5 MG: 2.5 SOLUTION RESPIRATORY (INHALATION) at 22:28

## 2017-02-23 RX ADMIN — PREGABALIN 50 MG: 50 CAPSULE ORAL at 10:57

## 2017-02-23 NOTE — PROGRESS NOTES
Problem: Nutrition Deficit  Goal: *Optimize nutritional status  Nutrition:   Acknowledge Nutrition Consult for Electrolyte Replacement Management. (Dr. Andrew Akins)   The patient presents with no acute nutrition risk factors. Labs are remarkable for K 3.0, currently being replaced. Labs are wdl. Nutrition Support Orders for Electrolyte Replacement Management are now activated and on the MAR. Follow-up based on standards of care.    Shaggy Weathers [address], [address], [phone]

## 2017-02-23 NOTE — PROGRESS NOTES
[facility]  Admission Date: 2/20/2017             Daily Progress Note: 2/23/2017    The patient's chart is reviewed and the patient is discussed with the staff. 80 y.o. presents after being found down at her DESIRE. Her daughter provides all history. She reportedly feeling bad for a few days with cough, sore throat, and fever. She did not report dyspnea. Went to an  [phone] 2/19/17, was diagnosed with flu, started on Tamiflu last night and was found down. CPR was started, EMS called, but on EMS arrival she was awake and conversant. Was started on O2 and brought to the ER. CXR consistent with pneumonia, she was hypoxic and placed on Opti-flow. Troponin was elevated around 1. She was admitted, remained confused and DNR status confirmed. Subjective:     Lying in bed and feels weak. States has been in bed too long. Noted audible rhonchi. Not able to expectorate much. Feels tired. On 4 LPM oxygen and does not use at home.      Current Facility-Administered Medications   Medication Dose Route Frequency    enoxaparin (LOVENOX) injection 40 mg  40 mg SubCUTAneous Q24H    oseltamivir (TAMIFLU) capsule 30 mg  30 mg Oral Q12H    piperacillin-tazobactam (ZOSYN) 4.5 g in 0.9% sodium chloride (MBP/ADV) 100 mL  4.5 g IntraVENous Q8H    acetaminophen (TYLENOL) tablet 650 mg  650 mg Oral Q6H PRN    aspirin chewable tablet 81 mg  81 mg Oral DAILY    atenolol (TENORMIN) tablet 100 mg  100 mg Oral DAILY    clorazepate (TRANXENE) tablet 3.75 mg  3.75 mg Oral TID    DULoxetine (CYMBALTA) capsule 30 mg  30 mg Oral DAILY    escitalopram oxalate (LEXAPRO) tablet 20 mg  20 mg Oral DAILY WITH DINNER    fenofibrate (LOFIBRA) tablet 54 mg  54 mg Oral DAILY    ferrous sulfate tablet 325 mg  1 Tab Oral DAILY WITH BREAKFAST    folic acid (FOLVITE) tablet 1 mg  1 mg Oral DAILY    hydroCHLOROthiazide (HYDRODIURIL) tablet 25 mg  25 mg Oral DAILY    memantine (NAMENDA) tablet 10 mg  10 mg Oral BID    pregabalin (LYRICA) capsule 50 mg  50 mg Oral TID    senna (SENOKOT) tablet 8.6 mg  1 Tab Oral QHS    sodium chloride (NS) flush 5-10 mL  5-10 mL IntraVENous Q8H    sodium chloride (NS) flush 5-10 mL  5-10 mL IntraVENous PRN    vancomycin (VANCOCIN) 1250 mg in  ml infusion  1,250 mg IntraVENous Q24H       Review of Systems  Constitutional: negative for fever, chills, sweats  Cardiovascular: negative for chest pain, palpitations, syncope, edema  Gastrointestinal:  negative for dysphagia, reflux, vomiting, diarrhea, abdominal pain, or melena  Neurologic:  negative for focal weakness, numbness, headache    Objective:     Vitals:    02/22/17 2004 02/23/17 0031 02/23/17 0400 02/23/17 1032   BP: 137/72 145/81 129/86 138/81   Pulse: 67 70 (!) 58 62   Resp: 18 18 18 18   Temp: 98.7 °F (37.1 °C) 98.2 °F (36.8 °C) 96.2 °F (35.7 °C) 97.7 °F (36.5 °C)   SpO2: 100% 100% 96% 99%   Weight:       Height:         Intake and Output:   02/21 1901 - 02/23 0700  In: -   Out: 2 [Urine:1]       Physical Exam:   Constitution:  the patient is well developed, elderly and in no acute distress, NC 4L   EENMT:  Sclera clear, pupils equal, oral mucosa moist  Respiratory: b/l rhonchi  Cardiovascular:  RRR without M,G,R  Gastrointestinal: soft and non-tender; with positive bowel sounds. Musculoskeletal: warm without cyanosis. There is no lower leg edema. Skin:  no jaundice or rashes, no wounds   Neurologic: no gross neuro deficits     Psychiatric:  alert and oriented x 2    ECHO:  -  Left ventricle: Systolic function was mildly reduced. EF 45 % to 50 %. There was mild hypokinesis of the mid  anteroseptal wall(s). There was mild hypokinesis of the mid anterolateral wall(s). Wall thickness was mildly increased. Doppler parameters were consistent with mild diastolic dysfunction (grade 1). -  Right ventricle: There was mild pulmonary artery hypertension.  -  Aortic valve: There was mild regurgitation. -  Mitral valve:  There was mild regurgitation. -  Tricuspid valve: There was mild regurgitation. -  Pulmonic valve: There was mild to moderate regurgitation. CHEST XRAY: None today    CHEST XRAY 2/20/17:        LAB  No results for input(s): GLUCPOC in the last 72 hours. No lab exists for component: [place]   Recent Labs      02/22/17 0615 02/21/17 0135 02/20/17 1905   WBC  11.4*  14.0*  17.3*   HGB  13.7  15.3  16.8*   HCT  40.4  44.3  49.1*   PLT  169  157  241   INR   --    --   1.1     Recent Labs      02/23/17   0615  02/23/17   0040  02/22/17 2125  02/22/17 1930 02/22/17 0615 02/21/17 0135 02/20/17 1905   NA  141   --    --    --    --   140   --   137  132*   K  3.0*   --    --    --    --   3.1*   --   3.2*  4.1   CL  100   --    --    --    --   101   --   98  90*   CO2  31   --    --    --    --   28   --   27  26   GLU  91   --    --    --    --   99   --   160*  195*   BUN  17   --    --    --    --   25*   --   29*  27*   CREA  0.50*   --   0.69   --    --   0.73   --   0.97  1.44*   MG   --    --    --    --    --    --    --    --   2.2   CA  8.3   --    --    --    --   8.1*   --   8.1*  9.2   TROIQ  1.32*  1.60*   --   2.33*   < >  2.81*   < >  2.56*  1.50*   ALB   --    --    --    --    --    --    --    --   3.6   TBILI   --    --    --    --    --    --    --    --   1.0   ALT   --    --    --    --    --    --    --    --   150*   SGOT   --    --    --    --    --    --    --    --   338*    < > = values in this interval not displayed. Recent Labs      02/20/17 1910   PH  7.35   PCO2  37   PO2  175*   HCO3  20*     No results for input(s): LCAD, LAC in the last 72 hours. Assessment:  (Medical Decision Making)     Hospital Problems  Date Reviewed: 2/22/2017          Codes Class Noted POA    Acute respiratory failure with hypoxia [facility]) ICD-10-CM: J96.01  ICD-9-CM: 518.81  2/20/2017 Yes    Wean O2 as tolerated, does not use at home.     * (Principal)Influenza ICD-10-CM: J11.1  ICD-9-CM: 487.1  2/20/2017 Yes    Continue Tamiflu    DNR (do not resuscitate) ICD-10-CM: Z66  ICD-9-CM: V49.86  2/20/2017 Yes    unchanged    HCAP (healthcare-associated pneumonia) ICD-10-CM: J18.9  ICD-9-CM: 225  2/20/2017 Yes    continue antibiotics    Elevated troponin ICD-10-CM: R79.89  ICD-9-CM: 790.6  2/20/2017 Yes    Still trending up--2.93 now          Plan:  (Medical Decision Making)     --Tamiflu dose # 5  --Zosyn, Vancomycin day 4 and U.c with E-coli only. Will stop vanco. F/u other cultures  --add nebs to help excepctorate and flutter valve. Add mucinex. --PT for mobility--OOB with assistance  --Wean O2 as tolerated  --Troponin down likely  demand mismatching. Echo noted with EF of 40-45%. --replace k+  --check mag    I have spoken with and examined the patient. I agree with the above assessment and plan as documented.     Marsha Rogel MD

## 2017-02-23 NOTE — CDMP QUERY
Please clarify if this patient is being treated/managed for:    Sepsis- present on admission  In the setting of flu/ pneumonia with Lactic 7.1, WBC 17.3, tachypnea, AMS worse than norm being treated with fluid bolus and iv antibiotics. =>Other Explanation of clinical findings  =>Unable to Determine (no explanation of clinical findings)    The medical record reflects the following:    Risk Factors: 89f from assisted living facility with flu    Clinical Indicators: Lactic 7.1, WBC 17.3, resp rate of 24 with altered mental status and pneumonia    Treatment: 2 Liters NS, Zosyn, Vancomycin. Please clarify and document your clinical opinion in the progress notes and discharge summary including the definitive and/or presumptive diagnosis, (suspected or probable), related to the above clinical findings. Please include clinical findings supporting your diagnosis.     Thanks,  Juancho Ramirez RN, CDS  Compliant Documentation Management Program  [phone]

## 2017-02-23 NOTE — PROGRESS NOTES
Problem: Mobility Impaired (Adult and Pediatric)  Goal: *Acute Goals and Plan of Care (Insert Text)  STG:  (1.)Ms. Webb will move from supine to sit and sit to supine , scoot up and down and roll side to side with CONTACT GUARD ASSIST within 5 day(s). (2.)Ms. Webb will transfer from bed to chair and chair to bed with MINIMAL ASSIST using the least restrictive device within 5 day(s). (3.)Ms. Webb will ambulate with MINIMAL ASSIST for 5 feet with the least restrictive device within 5 day(s). (4.)Ms. Webb will perform LE exercises with 3 to 5 cues for form within 3 days to improve strength for functional transfers and ambulation. LTG:  (1.)Ms. Webb will move from supine to sit and sit to supine , scoot up and down and roll side to side in bed with STAND BY ASSIST within 10 day(s). (2.)Ms. Webb will transfer from bed to chair and chair to bed with CONTACT GUARD ASSIST using the least restrictive device within 10 day(s). (3.)Ms. Webb will ambulate with CONTACT GUARD ASSIST for 10 feet with the least restrictive device within 10 day(s). ________________________________________________________________________________________________      PHYSICAL THERAPY: INITIAL ASSESSMENT, AM 2/23/2017  INPATIENT: Hospital Day: 4  [place] / Product Type: Medicare /      NAME/AGE/GENDER: Britney Rosado is a 80 y.o. female   PRIMARY DIAGNOSIS: Influenza Influenza Influenza        ICD-10: Treatment Diagnosis:       · Difficulty in walking, Not elsewhere classified (R26.2)   Precaution/Allergies:  Neurontin [gabapentin]       ASSESSMENT:      Ms. Webb is a 80 y.o. female with influenza. She lives in assisted living at [address] [place] [address]. No family present and patient confused, but patient reports typically mobilizing via wheelchair and walking very short distances. She is agreeable to therapy.  Required moderate assistance for all mobility, unable to transfer to chair as patient would not fully stand up (increased trunk flexion). She reports fear of falling. She was able to take 2 side steps, requiring manual assistance to move LLE and then returned to supine position with all needs in reach. She would continue to benefit from skilled PT post d/c. Deanne Ware is currently functioning below her baseline and would benefit from skilled PT during acute care stay to maximize safety and independence with functional mobility. This section established at most recent assessment   PROBLEM LIST (Impairments causing functional limitations):  1. Decreased Strength  2. Decreased ADL/Functional Activities  3. Decreased Transfer Abilities  4. Decreased Ambulation Ability/Technique  5. Decreased Balance  6. Decreased Activity Tolerance  7. Decreased Pacing Skills  8. Decreased Knowledge of Precautions  9. Decreased [place] with Home Exercise Program  10. Decreased Cognition    INTERVENTIONS PLANNED: (Benefits and precautions of physical therapy have been discussed with the patient.)  1. Balance Exercise  2. Bed Mobility  3. Family Education  4. Gait Training  5. Home Exercise Program (HEP)  6. Therapeutic Activites  7. Therapeutic Exercise/Strengthening  8. Transfer Training  9. patient education  10. Group Therapy      TREATMENT PLAN: Frequency/Duration: 3 times a week for duration of hospital stay  Rehabilitation Potential For Stated Goals: FAIR      RECOMMENDED REHABILITATION/EQUIPMENT: (at time of discharge pending progress): Continue Skilled Therapy. HISTORY:   History of Present Injury/Illness (Reason for Referral):  Per MD Note: \"Patient is a 80 y.o.  female presents after being found down at her [facility]. Her daughter provides all history. She reportedly began feeling bad over the past few days with increased cough, sore throat, and fever. She did not report any dyspnea.  She went to an  yesterday and was diagnosed with flu, started on tamiflu last night. Staff reportedly found her down about 6pm this evening. CPR was apparently started and EMS called, but on EMS arrival the patient was awake and conversant. She was started on O2 and brought to the ER. Her CXR was consistent with pna, she was hypoxic and is currently on optiflow. Troponin was elevated around 1. We have been asked to admit her. The patient remains confused above her baseline. The daughter states that she is DNR.\"  Past Medical History/Comorbidities:   Ms. Webb  has a past medical history of Arthritis; Cancer ([address] Hypertension; Ill-defined condition; Ill-defined condition; Ill-defined condition; Neurological disorder; and Psychiatric disorder. Ms. Webb  has a past surgical history that includes gyn and heent. Social History/Living Environment:   Home Environment: [facility] [address] [facility] [address] Name: Ayanna Mcgraw at [address]  # Steps to Enter: 0  One/Two Story Residence: One story  Living Alone: No  Support Systems: Child(abimael), Family member(s), Assisted living  Patient Expects to be Discharged to[de-identified] Unknown  Current DME Used/Available at Home: Wheelchair, Walker, rolling  Prior Level of Function/Work/Activity:  Patient confused, no family to report prior level of function. She reports using rolling walker for short ambulation and wheelchair. Number of Personal Factors/Comorbidities that affect the Plan of Care: 3+: HIGH COMPLEXITY   EXAMINATION:   Most Recent Physical Functioning:   Gross Assessment:  Strength: Generally decreased, functional  Coordination: Generally decreased, functional               Posture:  Posture (WDL): Exceptions to WDL  Posture Assessment: Forward head, Rounded shoulders  Balance:  Sitting: Impaired  Sitting - Static: Good (unsupported)  Sitting - Dynamic: Fair (occasional)  Standing: Impaired  Standing - Static: Fair  Standing - Dynamic : Poor Bed Mobility:  Supine to Sit: Moderate assistance  Sit to Supine:  Moderate assistance  Scooting: Minimum assistance  Wheelchair Mobility:     Transfers:  Sit to Stand: Moderate assistance  Stand to Sit: Moderate assistance  Gait:     Base of Support: Center of gravity altered; Widened  Speed/Anita: Slow;Shuffled;Pace decreased (<100 feet/min)  Step Length: Right shortened;Left shortened  Swing Pattern: Left symmetrical;Right symmetrical  Stance: Right increased; Left increased  Gait Abnormalities: Decreased step clearance;Trunk sway increased; Path deviations  Distance (ft): 2 Feet (ft)  Assistive Device: Walker, rolling  Ambulation - Level of Assistance: Moderate assistance  Interventions: Manual cues; Safety awareness training;Verbal cues       Body Structures Involved:  1. Heart  2. Lungs  3. Muscles Body Functions Affected:  1. Cardio  2. Respiratory  3. Neuromusculoskeletal  4. Movement Related Activities and Participation Affected:  1. Learning and Applying Knowledge  2. Mobility  3. Self Care  4. Domestic Life  5. Interpersonal Interactions and Relationships  6. Community, Social and [place]   Number of elements that affect the Plan of Care: 4+: HIGH COMPLEXITY   CLINICAL PRESENTATION:   Presentation: Stable and uncomplicated: LOW COMPLEXITY   CLINICAL DECISION MAKIN [facility] Inpatient Short Form  How much difficulty does the patient currently have. .. Unable A Lot A Little None   1. Turning over in bed (including adjusting bedclothes, sheets and blankets)? [ ] 1   [X] 2   [ ] 3   [ ] 4   2. Sitting down on and standing up from a chair with arms ( e.g., wheelchair, bedside commode, etc.)   [ ] 1   [X] 2   [ ] 3   [ ] 4   3. Moving from lying on back to sitting on the side of the bed? [ ] 1   [X] 2   [ ] 3   [ ] 4   How much help from another person does the patient currently need. .. Total A Lot A Little None   4. Moving to and from a bed to a chair (including a wheelchair)? [ ] 1   [X] 2   [ ] 3   [ ] 4   5. Need to walk in hospital room?    [ ] 1   [X] 2   [ ] 3   [ ] 4   6. Climbing 3-5 steps with a railing? [X] 1   [ ] 2   [ ] 3   [ ] 4   © 2007, Trustees of [address] [place] [address] [address] [address], under license to [employer]. All rights reserved    Score:  Initial: 11 Most Recent: X (Date: -- )     Interpretation of Tool:  Represents activities that are increasingly more difficult (i.e. Bed mobility, Transfers, Gait). Score 24 23 22-20 19-15 14-10 9-7 6       Modifier CH CI CJ CK CL CM CN         · Mobility - Walking and Moving Around:               - CURRENT STATUS:    CL - 60%-79% impaired, limited or restricted               - GOAL STATUS:           CK - 40%-59% impaired, limited or restricted               - D/C STATUS:                       ---------------To be determined---------------  [place] / [place] / Product Type: Medicare /       Medical Necessity:     · Patient demonstrates fair rehab potential due to higher previous functional level. Reason for Services/Other Comments:  · Patient continues to require modification of therapeutic interventions to increase complexity of exercises. Use of outcome tool(s) and clinical judgement create a POC that gives a: Questionable prediction of patient's progress: MODERATE COMPLEXITY                 TREATMENT:   (In addition to Assessment/Re-Assessment sessions the following treatments were rendered)   Pre-treatment Symptoms/Complaints:  none  Pain: Initial:   Pain Intensity 1: 0  Post Session:  C/o fatigue, no pain      Assessment/Reassessment only, no treatment provided today     Braces/Orthotics/Lines/Etc:   · O2 Device: Nasal cannula  Treatment/Session Assessment:    · Response to Treatment:  Patient tolerated treatment well.   · Interdisciplinary Collaboration:  · Physical Therapist  · Registered Nurse  · After treatment position/precautions:  · Supine in bed  · Bed alarm/tab alert on  · Bed/Chair-wheels locked  · Bed in low position  · Call light within reach  · RN notified  · Compliance with Program/Exercises: Will assess as treatment progresses. · Recommendations/Intent for next treatment session: \"Next visit will focus on advancements to more challenging activities and reduction in assistance provided\".   Total Treatment Duration:  PT Patient Time In/Time Out  Time In: 0832  Time Out: [address] DPT

## 2017-02-24 PROBLEM — A41.9 SEPSIS (HCC): Status: ACTIVE | Noted: 2017-02-24

## 2017-02-24 LAB
BNP SERPL-MCNC: 2234 PG/ML
TROPONIN I SERPL-MCNC: 0.18 NG/ML (ref 0.02–0.05)
TROPONIN I SERPL-MCNC: 0.31 NG/ML (ref 0.02–0.05)
TROPONIN I SERPL-MCNC: 0.35 NG/ML (ref 0.02–0.05)
TROPONIN I SERPL-MCNC: 0.48 NG/ML (ref 0.02–0.05)

## 2017-02-24 PROCEDURE — 74011250637 HC RX REV CODE- 250/637: Performed by: INTERNAL MEDICINE

## 2017-02-24 PROCEDURE — 97165 OT EVAL LOW COMPLEX 30 MIN: CPT

## 2017-02-24 PROCEDURE — 94760 N-INVAS EAR/PLS OXIMETRY 1: CPT

## 2017-02-24 PROCEDURE — 65270000029 HC RM PRIVATE

## 2017-02-24 PROCEDURE — 74011250636 HC RX REV CODE- 250/636: Performed by: INTERNAL MEDICINE

## 2017-02-24 PROCEDURE — 83880 ASSAY OF NATRIURETIC PEPTIDE: CPT | Performed by: INTERNAL MEDICINE

## 2017-02-24 PROCEDURE — 36415 COLL VENOUS BLD VENIPUNCTURE: CPT | Performed by: INTERNAL MEDICINE

## 2017-02-24 PROCEDURE — 77010033678 HC OXYGEN DAILY

## 2017-02-24 PROCEDURE — 74011000258 HC RX REV CODE- 258: Performed by: INTERNAL MEDICINE

## 2017-02-24 PROCEDURE — 84484 ASSAY OF TROPONIN QUANT: CPT | Performed by: INTERNAL MEDICINE

## 2017-02-24 PROCEDURE — 74011000250 HC RX REV CODE- 250: Performed by: INTERNAL MEDICINE

## 2017-02-24 PROCEDURE — 99232 SBSQ HOSP IP/OBS MODERATE 35: CPT | Performed by: INTERNAL MEDICINE

## 2017-02-24 PROCEDURE — 94640 AIRWAY INHALATION TREATMENT: CPT

## 2017-02-24 RX ADMIN — Medication 5 ML: at 14:00

## 2017-02-24 RX ADMIN — PREGABALIN 50 MG: 50 CAPSULE ORAL at 22:30

## 2017-02-24 RX ADMIN — CLORAZEPATE DIPOTASSIUM 3.75 MG: 7.5 TABLET ORAL at 08:59

## 2017-02-24 RX ADMIN — OSELTAMIVIR PHOSPHATE 30 MG: 30 CAPSULE ORAL at 22:30

## 2017-02-24 RX ADMIN — ESCITALOPRAM OXALATE 20 MG: 10 TABLET ORAL at 17:26

## 2017-02-24 RX ADMIN — HYDROCHLOROTHIAZIDE 25 MG: 25 TABLET ORAL at 08:58

## 2017-02-24 RX ADMIN — PREGABALIN 50 MG: 50 CAPSULE ORAL at 15:21

## 2017-02-24 RX ADMIN — CLORAZEPATE DIPOTASSIUM 3.75 MG: 7.5 TABLET ORAL at 15:21

## 2017-02-24 RX ADMIN — CLORAZEPATE DIPOTASSIUM 3.75 MG: 7.5 TABLET ORAL at 22:29

## 2017-02-24 RX ADMIN — OSELTAMIVIR PHOSPHATE 30 MG: 30 CAPSULE ORAL at 09:00

## 2017-02-24 RX ADMIN — PIPERACILLIN SODIUM,TAZOBACTAM SODIUM 4.5 G: 4; .5 INJECTION, POWDER, FOR SOLUTION INTRAVENOUS at 22:30

## 2017-02-24 RX ADMIN — PREGABALIN 50 MG: 50 CAPSULE ORAL at 08:59

## 2017-02-24 RX ADMIN — PIPERACILLIN SODIUM,TAZOBACTAM SODIUM 4.5 G: 4; .5 INJECTION, POWDER, FOR SOLUTION INTRAVENOUS at 13:07

## 2017-02-24 RX ADMIN — FENOFIBRATE 54 MG: 54 TABLET ORAL at 08:58

## 2017-02-24 RX ADMIN — MEMANTINE HYDROCHLORIDE 10 MG: 5 TABLET ORAL at 17:26

## 2017-02-24 RX ADMIN — Medication 10 ML: at 05:53

## 2017-02-24 RX ADMIN — PIPERACILLIN SODIUM,TAZOBACTAM SODIUM 4.5 G: 4; .5 INJECTION, POWDER, FOR SOLUTION INTRAVENOUS at 05:51

## 2017-02-24 RX ADMIN — GUAIFENESIN 1200 MG: 600 TABLET, EXTENDED RELEASE ORAL at 08:58

## 2017-02-24 RX ADMIN — DULOXETINE HYDROCHLORIDE 30 MG: 30 CAPSULE, DELAYED RELEASE ORAL at 08:59

## 2017-02-24 RX ADMIN — FERROUS SULFATE TAB 325 MG (65 MG ELEMENTAL FE) 325 MG: 325 (65 FE) TAB at 08:58

## 2017-02-24 RX ADMIN — MEMANTINE HYDROCHLORIDE 10 MG: 5 TABLET ORAL at 08:59

## 2017-02-24 RX ADMIN — ALBUTEROL SULFATE 2.5 MG: 2.5 SOLUTION RESPIRATORY (INHALATION) at 21:21

## 2017-02-24 RX ADMIN — ACETAMINOPHEN 650 MG: 325 TABLET, FILM COATED ORAL at 08:58

## 2017-02-24 RX ADMIN — METHYLPREDNISOLONE SODIUM SUCCINATE 40 MG: 40 INJECTION, POWDER, FOR SOLUTION INTRAMUSCULAR; INTRAVENOUS at 22:34

## 2017-02-24 RX ADMIN — Medication 10 ML: at 22:33

## 2017-02-24 RX ADMIN — SENNOSIDES 8.6 MG: 8.6 TABLET, FILM COATED ORAL at 22:30

## 2017-02-24 RX ADMIN — ALBUTEROL SULFATE 2.5 MG: 2.5 SOLUTION RESPIRATORY (INHALATION) at 11:40

## 2017-02-24 RX ADMIN — ENOXAPARIN SODIUM 40 MG: 40 INJECTION SUBCUTANEOUS at 08:58

## 2017-02-24 RX ADMIN — FOLIC ACID 1 MG: 1 TABLET ORAL at 08:59

## 2017-02-24 RX ADMIN — ATENOLOL 100 MG: 50 TABLET ORAL at 08:59

## 2017-02-24 RX ADMIN — METHYLPREDNISOLONE SODIUM SUCCINATE 40 MG: 40 INJECTION, POWDER, FOR SOLUTION INTRAMUSCULAR; INTRAVENOUS at 13:07

## 2017-02-24 RX ADMIN — GUAIFENESIN 1200 MG: 600 TABLET, EXTENDED RELEASE ORAL at 22:30

## 2017-02-24 RX ADMIN — ALBUTEROL SULFATE 2.5 MG: 2.5 SOLUTION RESPIRATORY (INHALATION) at 15:02

## 2017-02-24 RX ADMIN — ASPIRIN 81 MG 81 MG: 81 TABLET ORAL at 08:58

## 2017-02-24 NOTE — PROGRESS NOTES
Problem: Self Care Deficits Care Plan (Adult)  Goal: *Acute Goals and Plan of Care (Insert Text)  1. Patient will complete lower body bathing and dressing with minimal assistance and adaptive equipment as needed. 2. Patient will complete toileting with minimal assistance. 3. Patient will tolerate 30 minutes of OT treatment with 3 rest breaks to increase activity tolerance for ADLs. 4. Patient will complete functional transfers with CGA and adaptive equipment as needed. Timeframe: 7 visits       OCCUPATIONAL THERAPY: Initial Assessment 2/24/2017  INPATIENT: Hospital Day: 5  [place] / Product Type: Medicare /      NAME/AGE/GENDER: Allie Mistry is a 80 y.o. female   PRIMARY DIAGNOSIS:  Influenza Influenza Influenza        ICD-10: Treatment Diagnosis:        · Generalized Muscle Weakness (M62.81)   Precautions/Allergies:         Neurontin [gabapentin]       ASSESSMENT:      Ms. Webb presents with overall decreased weakness secondary to flu. She is oriented x 4 and agreeable to therapy but insistent on returning to bed for a nap. At baseline, pt lives at an [facility], uses a walker to transfer to w/c, receives minimal assistance for ADLs as needed. Currently,  presents with decreased activity tolerance, and decreased independence for self care, functional mobility, and ADL's. Requires skilled OT to maximize independence with self care tasks and functional transfers. Ms. Webb is left with all needs in reach, pt instructed to call for assistance; RN aware. This section established at most recent assessment   PROBLEM LIST (Impairments causing functional limitations):  1. Decreased Strength  2. Decreased ADL/Functional Activities  3. Decreased Transfer Abilities  4. Decreased Ambulation Ability/Technique  5. Decreased Balance  6. Decreased Activity Tolerance  7.  Increased Fatigue    INTERVENTIONS PLANNED: (Benefits and precautions of occupational therapy have been discussed with the patient.)  1. Activities of daily living training  2. Adaptive equipment training  3. Balance training  4. Theraputic activity  5. Theraputic exercise  6. Energy conservation      TREATMENT PLAN: Frequency/Duration: Follow patient 3x/week to address above goals. Rehabilitation Potential For Stated Goals: GOOD      RECOMMENDED REHABILITATION/EQUIPMENT: (at time of discharge pending progress): Continue Skilled Therapy and Rehab. OCCUPATIONAL PROFILE AND HISTORY:   History of Present Injury/Illness (Reason for Referral):  80 y.o. presents after being found down at her [facility]. Her daughter provides all history. She reportedly feeling bad for a few days with cough, sore throat, and fever. She did not report dyspnea. Went to an  [phone] 2/19/17, was diagnosed with flu, started on Tamiflu last night and was found down. CPR was started, EMS called, but on EMS arrival she was awake and conversant. Was started on O2 and brought to the ER. CXR consistent with pneumonia, she was hypoxic and placed on Opti-flow. Troponin was elevated around 1. She was admitted, remained confused and DNR status confirmed  She is some better but still coughing and congested on 3 L o2  Past Medical History/Comorbidities:   Ms. Alonzo Schafer  has a past medical history of Arthritis; Cancer ([facility] [address] Hypertension; Ill-defined condition; Ill-defined condition; Ill-defined condition; Neurological disorder; and Psychiatric disorder. Ms. Alonzo Schafer  has a past surgical history that includes gyn and heent.   Social History/Living Environment:   Home Environment: [address] Name: [place]  # Steps to Enter: 0  One/Two Story Residence: One story  Living Alone: No  Support Systems: Child(abimael), Family member(s), Assisted living  Patient Expects to be Discharged to[de-identified] Unknown  Current DME Used/Available at Home: Wheelchair, Walker, rolling  Tub or Shower Type: Shower  Prior Level of Function/Work/Activity:  independent      Number of Personal Factors/Comorbidities that affect the Plan of Care: Expanded review of therapy/medical records (1-2):  MODERATE COMPLEXITY   ASSESSMENT OF OCCUPATIONAL PERFORMANCE[de-identified]   Activities of Daily Living:           Basic ADLs (From Assessment) Complex ADLs (From Assessment)   Basic ADL  Feeding: Stand-by assistance, Setup  Oral Facial Hygiene/Grooming: Stand-by assistance, Setup  Bathing: Moderate assistance  Upper Body Dressing: Stand-by assistance  Lower Body Dressing: Moderate assistance  Toileting: Contact guard assistance     Grooming/Bathing/Dressing Activities of Daily Living     Cognitive Retraining  Safety/Judgement: Fall prevention                 Functional Transfers  Toilet Transfer : Moderate assistance (BSC)  Shower Transfer: Maximum assistance     Bed/Mat Mobility  Supine to Sit: Moderate assistance  Sit to Supine: Minimum assistance  Sit to Stand: Moderate assistance  Bed to Chair: Moderate assistance          Most Recent Physical Functioning:   Gross Assessment:  Strength: Generally decreased, functional  Coordination: Generally decreased, functional               Posture:  Posture (WDL): Exceptions to WDL  Posture Assessment: Forward head, Rounded shoulders  Balance:  Sitting: Impaired  Sitting - Static: Good (unsupported)  Sitting - Dynamic: Fair (occasional)  Standing: Impaired  Standing - Static: Fair  Standing - Dynamic : Poor Bed Mobility:  Supine to Sit: Moderate assistance  Sit to Supine: Minimum assistance  Wheelchair Mobility:     Transfers:  Sit to Stand:  Moderate assistance  Stand to Sit: Moderate assistance  Bed to Chair: Moderate assistance                 Patient Vitals for the past 6 hrs:       BP BP Patient Position SpO2 O2 Flow Rate (L/min) Pulse   02/24/17 1132 - At rest - - -   02/24/17 1405 132/79 At rest 99 % - (!) 56   02/24/17 1502 - - 97 % 3 l/min -        Mental Status  Neurologic State: Alert  Orientation Level: Oriented X4  Cognition: Decreased attention/concentration, Appropriate for age attention/concentration  Perception: Appears intact  Perseveration: No perseveration noted  Safety/Judgement: Fall prevention                               Physical Skills Involved:  1. Balance  2. Mobility  3. Strength  4. Endurance Cognitive Skills Affected (resulting in the inability to perform in a timely and safe manner):  1. Attending  2. Problem Solving Psychosocial Skills Affected:  1. Active Use of Coping Strategies  2. Environmental Adaptations   Number of elements that affect the Plan of Care: 3-5:  MODERATE COMPLEXITY   CLINICAL DECISION MAKIN[address] [place] [address] AM-PAC 6 Clicks   Basic Mobility Inpatient Short Form  How much help from another person does the patient currently need. .. Total A Lot A Little None   1. Putting on and taking off regular lower body clothing?   [ ] 1   [X] 2   [ ] 3   [ ] 4   2. Bathing (including washing, rinsing, drying)? [ ] 1   [X] 2   [ ] 3   [ ] 4   3. Toileting, which includes using toilet, bedpan or urinal?   [ ] 1   [X] 2   [ ] 3   [ ] 4   4. Putting on and taking off regular upper body clothing?   [ ] 1   [ ] 2   [X] 3   [ ] 4   5. Taking care of personal grooming such as brushing teeth? [ ] 1   [ ] 2   [X] 3   [ ] 4   6. Eating meals? [ ] 1   [ ] 2   [X] 3   [ ] 4   © , Trustees of [address] [place] [address], under license to [employer]. All rights reserved    Score:  Initial: 15 Most Recent: X (Date: -- )     Interpretation of Tool:  Represents activities that are increasingly more difficult (i.e. Bed mobility, Transfers, Gait).        Score 24 23 22-20 19-15 14-10 9-7 6       Modifier CH CI CJ CK CL CM CN         · Self Care:               - CURRENT STATUS:    CK - 40%-59% impaired, limited or restricted               - GOAL STATUS:           CJ - 20%-39% impaired, limited or restricted               - D/C STATUS:                       ---------------To be determined---------------  [place] / Product Type: Medicare /       Medical Necessity:     · Patient demonstrates good rehab potential due to higher previous functional level. Reason for Services/Other Comments:  · Patient continues to require skilled intervention due to patient unable to attend/participate in therapy as expected. Use of outcome tool(s) and clinical judgement create a POC that gives a: LOW COMPLEXITY             TREATMENT:   (In addition to Assessment/Re-Assessment sessions the following treatments were rendered)      Pre-treatment Symptoms/Complaints:  Decreased ability to perform ADLs, self care, and functional mobility; decreased tolerance for activities  Pain: Initial:   Pain Intensity 1: 0  Post Session:  0      Assessment/Reassessment only, no treatment provided today     Braces/Orthotics/Lines/Etc:   · O2 Device: Nasal cannula  Treatment/Session Assessment:    · Response to Treatment:  Agrees to OT  · Interdisciplinary Collaboration:  · Occupational Therapist  · Registered Nurse  · After treatment position/precautions:  · Bed/Chair-wheels locked  · Call light within reach  · RN notified  · Compliance with Program/Exercises: Will assess as treatment progresses. · Recommendations/Intent for next treatment session: \"Next visit will focus on advancements to more challenging activities and reduction in assistance provided\".   Total Treatment Duration:  OT Patient Time In/Time Out  Time In: 1848  Time Out: Carmen 459, OTR/L

## 2017-02-24 NOTE — PROGRESS NOTES
[facility]  Admission Date: 2/20/2017             Daily Progress Note: 2/24/2017    The patient's chart is reviewed and the patient is discussed with the staff. Subjective:     80 y.o. presents after being found down at her [facility]. Her daughter provides all history. She reportedly feeling bad for a few days with cough, sore throat, and fever. She did not report dyspnea. Went to an  [phone] 2/19/17, was diagnosed with flu, started on Tamiflu last night and was found down. CPR was started, EMS called, but on EMS arrival she was awake and conversant. Was started on O2 and brought to the ER. CXR consistent with pneumonia, she was hypoxic and placed on Opti-flow. Troponin was elevated around 1.  She was admitted, remained confused and DNR status confirmed  She is some better but still coughing and congested on 3 L o2    Current Facility-Administered Medications   Medication Dose Route Frequency    albuterol (PROVENTIL VENTOLIN) nebulizer solution 2.5 mg  2.5 mg Nebulization QID RT    guaiFENesin SR (MUCINEX) tablet 1,200 mg  1,200 mg Oral Q12H    NUTRITIONAL SUPPORT ELECTROLYTE PRN ORDERS   Does Not Apply PRN    enoxaparin (LOVENOX) injection 40 mg  40 mg SubCUTAneous Q24H    oseltamivir (TAMIFLU) capsule 30 mg  30 mg Oral Q12H    piperacillin-tazobactam (ZOSYN) 4.5 g in 0.9% sodium chloride (MBP/ADV) 100 mL  4.5 g IntraVENous Q8H    acetaminophen (TYLENOL) tablet 650 mg  650 mg Oral Q6H PRN    aspirin chewable tablet 81 mg  81 mg Oral DAILY    atenolol (TENORMIN) tablet 100 mg  100 mg Oral DAILY    clorazepate (TRANXENE) tablet 3.75 mg  3.75 mg Oral TID    DULoxetine (CYMBALTA) capsule 30 mg  30 mg Oral DAILY    escitalopram oxalate (LEXAPRO) tablet 20 mg  20 mg Oral DAILY WITH DINNER    fenofibrate (LOFIBRA) tablet 54 mg  54 mg Oral DAILY    ferrous sulfate tablet 325 mg  1 Tab Oral DAILY WITH BREAKFAST    folic acid (FOLVITE) tablet 1 mg  1 mg Oral DAILY    hydroCHLOROthiazide (HYDRODIURIL) tablet 25 mg  25 mg Oral DAILY    memantine (NAMENDA) tablet 10 mg  10 mg Oral BID    pregabalin (LYRICA) capsule 50 mg  50 mg Oral TID    senna (SENOKOT) tablet 8.6 mg  1 Tab Oral QHS    sodium chloride (NS) flush 5-10 mL  5-10 mL IntraVENous Q8H    sodium chloride (NS) flush 5-10 mL  5-10 mL IntraVENous PRN       Review of Systems    Constitutional: negative for fever, chills, sweats  Cardiovascular: negative for chest pain, palpitations, syncope, edema  Gastrointestinal:  negative for dysphagia, reflux, vomiting, diarrhea, abdominal pain, or melena  Neurologic:  negative for focal weakness, numbness, headache    Objective:     Vitals:    02/24/17 0409 02/24/17 0800 02/24/17 0827 02/24/17 1132   BP: 147/81 145/77     Pulse: 67 (!) 57     Resp: 18 18  18   Temp: 98.8 °F (37.1 °C) 97.8 °F (36.6 °C)     SpO2: 90% 100% 97%    Weight:       Height:         Intake and Output:   02/22 1901 - 02/24 0700  In: -   Out: 2 [Urine:1]       Physical Exam:   Constitution:  the patient is well developed and in no acute distress  EENMT:  Sclera clear, pupils equal, oral mucosa moist  Respiratory: rhonchi bilateral  Cardiovascular:  RRR without M,G,R  Gastrointestinal: soft and non-tender; with positive bowel sounds. Musculoskeletal: warm without cyanosis. There is no lower leg edema. Skin:  no jaundice or rashes, no wounds   Neurologic: no gross neuro deficits     Psychiatric:  alert and oriented x 3    CHEST XRAY:       LAB  No results for input(s): GLUCPOC in the last 72 hours.     No lab exists for component: [place]   Recent Labs      02/22/17   0615   WBC  11.4*   HGB  13.7   HCT  40.4   PLT  169     Recent Labs      02/24/17   0640  02/24/17   0015  02/23/17   1829  02/23/17   0615   02/22/17   2125   02/22/17   0615   NA   --    --    --   141   --    --    --   140   K   --    --    --   3.0*   --    --    --   3.1*   CL   --    --    --   100   --    --    --   101   CO2   --    --    --   31   --    -- --   28   GLU   --    --    --   91   --    --    --   99   BUN   --    --    --   17   --    --    --   25*   CREA   --    --    --   0.50*   --   0.69   --   0.73   MG   --    --    --   1.8   --    --    --    --    CA   --    --    --   8.3   --    --    --   8.1*   TROIQ  0.35*  0.48*  0.68*  1.32*   < >   --    < >  2.81*   BNPP   --   2234   --    --    --    --    --    --     < > = values in this interval not displayed. No results for input(s): PH, PCO2, PO2, HCO3 in the last 72 hours. No results for input(s): LCAD, LAC in the last 72 hours. Assessment:  (Medical Decision Making)     Hospital Problems  Date Reviewed: 2/22/2017          Codes Class Noted POA    Acute respiratory failure with hypoxia [facility]) ICD-10-CM: J96.01  ICD-9-CM: 518.81  2/20/2017 Yes    On 3 l    * (Principal)Influenza ICD-10-CM: J11.1  ICD-9-CM: 487.1  2/20/2017 Yes    tamiflu    DNR (do not resuscitate) ICD-10-CM: Z66  ICD-9-CM: V49.86  2/20/2017 Yes        HCAP (healthcare-associated pneumonia) ICD-10-CM: J18.9  ICD-9-CM: 280  2/20/2017 Yes    zosyn    Elevated troponin ICD-10-CM: R79.89  ICD-9-CM: 790.6  2/20/2017 Yes              Plan:  (Medical Decision Making)   1   Continue iv antibx  2   Bronchodilators  3   Solumedrol x 4 doses  --    More than 50% of the time documented was spent in face-to-face contact with the patient and in the care of the patient on the floor/unit where the patient is located.     Rafael Chavis MD

## 2017-02-25 PROBLEM — N39.0 UTI (URINARY TRACT INFECTION): Status: ACTIVE | Noted: 2017-02-25

## 2017-02-25 LAB
BACTERIA SPEC CULT: NORMAL
BACTERIA SPEC CULT: NORMAL
SERVICE CMNT-IMP: NORMAL
SERVICE CMNT-IMP: NORMAL
TROPONIN I SERPL-MCNC: 0.09 NG/ML (ref 0.02–0.05)
TROPONIN I SERPL-MCNC: 0.11 NG/ML (ref 0.02–0.05)
TROPONIN I SERPL-MCNC: 0.13 NG/ML (ref 0.02–0.05)
TROPONIN I SERPL-MCNC: 0.16 NG/ML (ref 0.02–0.05)

## 2017-02-25 PROCEDURE — 74011000250 HC RX REV CODE- 250: Performed by: INTERNAL MEDICINE

## 2017-02-25 PROCEDURE — 94760 N-INVAS EAR/PLS OXIMETRY 1: CPT

## 2017-02-25 PROCEDURE — 74011250636 HC RX REV CODE- 250/636: Performed by: INTERNAL MEDICINE

## 2017-02-25 PROCEDURE — 94640 AIRWAY INHALATION TREATMENT: CPT

## 2017-02-25 PROCEDURE — 74011000258 HC RX REV CODE- 258: Performed by: INTERNAL MEDICINE

## 2017-02-25 PROCEDURE — 84484 ASSAY OF TROPONIN QUANT: CPT | Performed by: INTERNAL MEDICINE

## 2017-02-25 PROCEDURE — 65270000029 HC RM PRIVATE

## 2017-02-25 PROCEDURE — 36415 COLL VENOUS BLD VENIPUNCTURE: CPT | Performed by: INTERNAL MEDICINE

## 2017-02-25 PROCEDURE — 77010033678 HC OXYGEN DAILY

## 2017-02-25 PROCEDURE — 74011250637 HC RX REV CODE- 250/637: Performed by: INTERNAL MEDICINE

## 2017-02-25 PROCEDURE — 99232 SBSQ HOSP IP/OBS MODERATE 35: CPT | Performed by: INTERNAL MEDICINE

## 2017-02-25 RX ORDER — ALBUTEROL SULFATE 0.83 MG/ML
2.5 SOLUTION RESPIRATORY (INHALATION)
Status: DISCONTINUED | OUTPATIENT
Start: 2017-02-25 | End: 2017-03-01 | Stop reason: HOSPADM

## 2017-02-25 RX ADMIN — ATENOLOL 100 MG: 50 TABLET ORAL at 10:07

## 2017-02-25 RX ADMIN — ALBUTEROL SULFATE 2.5 MG: 2.5 SOLUTION RESPIRATORY (INHALATION) at 08:25

## 2017-02-25 RX ADMIN — ASPIRIN 81 MG 81 MG: 81 TABLET ORAL at 10:06

## 2017-02-25 RX ADMIN — HYDROCHLOROTHIAZIDE 25 MG: 25 TABLET ORAL at 10:07

## 2017-02-25 RX ADMIN — OSELTAMIVIR PHOSPHATE 30 MG: 30 CAPSULE ORAL at 10:06

## 2017-02-25 RX ADMIN — PREGABALIN 50 MG: 50 CAPSULE ORAL at 10:06

## 2017-02-25 RX ADMIN — PREGABALIN 50 MG: 50 CAPSULE ORAL at 17:10

## 2017-02-25 RX ADMIN — ALBUTEROL SULFATE 2.5 MG: 2.5 SOLUTION RESPIRATORY (INHALATION) at 21:27

## 2017-02-25 RX ADMIN — METHYLPREDNISOLONE SODIUM SUCCINATE 40 MG: 40 INJECTION, POWDER, FOR SOLUTION INTRAMUSCULAR; INTRAVENOUS at 10:06

## 2017-02-25 RX ADMIN — FOLIC ACID 1 MG: 1 TABLET ORAL at 10:06

## 2017-02-25 RX ADMIN — PIPERACILLIN SODIUM,TAZOBACTAM SODIUM 4.5 G: 4; .5 INJECTION, POWDER, FOR SOLUTION INTRAVENOUS at 12:37

## 2017-02-25 RX ADMIN — CLORAZEPATE DIPOTASSIUM 3.75 MG: 7.5 TABLET ORAL at 10:07

## 2017-02-25 RX ADMIN — DULOXETINE HYDROCHLORIDE 30 MG: 30 CAPSULE, DELAYED RELEASE ORAL at 10:07

## 2017-02-25 RX ADMIN — FENOFIBRATE 54 MG: 54 TABLET ORAL at 10:06

## 2017-02-25 RX ADMIN — Medication 5 ML: at 12:38

## 2017-02-25 RX ADMIN — ENOXAPARIN SODIUM 40 MG: 40 INJECTION SUBCUTANEOUS at 10:06

## 2017-02-25 RX ADMIN — MEMANTINE HYDROCHLORIDE 10 MG: 5 TABLET ORAL at 10:06

## 2017-02-25 RX ADMIN — MEMANTINE HYDROCHLORIDE 10 MG: 5 TABLET ORAL at 17:10

## 2017-02-25 RX ADMIN — CLORAZEPATE DIPOTASSIUM 3.75 MG: 7.5 TABLET ORAL at 17:10

## 2017-02-25 RX ADMIN — ESCITALOPRAM OXALATE 20 MG: 10 TABLET ORAL at 17:10

## 2017-02-25 RX ADMIN — Medication 10 ML: at 05:32

## 2017-02-25 RX ADMIN — ACETAMINOPHEN 650 MG: 325 TABLET, FILM COATED ORAL at 10:07

## 2017-02-25 RX ADMIN — PIPERACILLIN SODIUM,TAZOBACTAM SODIUM 4.5 G: 4; .5 INJECTION, POWDER, FOR SOLUTION INTRAVENOUS at 05:31

## 2017-02-25 RX ADMIN — FERROUS SULFATE TAB 325 MG (65 MG ELEMENTAL FE) 325 MG: 325 (65 FE) TAB at 10:07

## 2017-02-25 RX ADMIN — GUAIFENESIN 1200 MG: 600 TABLET, EXTENDED RELEASE ORAL at 10:07

## 2017-02-25 NOTE — PROGRESS NOTES
[facility]  Admission Date: 2/20/2017             Daily Progress Note: 2/25/2017    The patient's chart is reviewed and the patient is discussed with the staff. 80 y.o. presents after being found down at her [facility]. Her daughter provides all history. She reportedly feeling bad for a few days with cough, sore throat, and fever. She did not report dyspnea. Went to an  [phone] 2/19/17, was diagnosed with flu, started on Tamiflu last night and was found down. CPR was started, EMS called, but on EMS arrival she was awake and conversant. Was started on O2 and brought to the ER. CXR consistent with pneumonia, she was hypoxic and placed on Opti-flow. Troponin was elevated around 1.  She was admitted, remained confused and DNR status confirmed  She is some better but still coughing and congested on 3 L o2    Subjective:     No new complains     Current Facility-Administered Medications   Medication Dose Route Frequency    albuterol (PROVENTIL VENTOLIN) nebulizer solution 2.5 mg  2.5 mg Nebulization TID RT    albuterol (PROVENTIL VENTOLIN) nebulizer solution 2.5 mg  2.5 mg Nebulization Q6H PRN    methylPREDNISolone (PF) (SOLU-MEDROL) injection 40 mg  40 mg IntraVENous Q12H    guaiFENesin ER (MUCINEX) tablet 1,200 mg  1,200 mg Oral Q12H    NUTRITIONAL SUPPORT ELECTROLYTE PRN ORDERS   Does Not Apply PRN    enoxaparin (LOVENOX) injection 40 mg  40 mg SubCUTAneous Q24H    oseltamivir (TAMIFLU) capsule 30 mg  30 mg Oral Q12H    piperacillin-tazobactam (ZOSYN) 4.5 g in 0.9% sodium chloride (MBP/ADV) 100 mL  4.5 g IntraVENous Q8H    acetaminophen (TYLENOL) tablet 650 mg  650 mg Oral Q6H PRN    aspirin chewable tablet 81 mg  81 mg Oral DAILY    atenolol (TENORMIN) tablet 100 mg  100 mg Oral DAILY    clorazepate (TRANXENE) tablet 3.75 mg  3.75 mg Oral TID    DULoxetine (CYMBALTA) capsule 30 mg  30 mg Oral DAILY    escitalopram oxalate (LEXAPRO) tablet 20 mg  20 mg Oral DAILY WITH DINNER    fenofibrate (LOFIBRA) tablet 54 mg  54 mg Oral DAILY    ferrous sulfate tablet 325 mg  1 Tab Oral DAILY WITH BREAKFAST    folic acid (FOLVITE) tablet 1 mg  1 mg Oral DAILY    hydroCHLOROthiazide (HYDRODIURIL) tablet 25 mg  25 mg Oral DAILY    memantine (NAMENDA) tablet 10 mg  10 mg Oral BID    pregabalin (LYRICA) capsule 50 mg  50 mg Oral TID    senna (SENOKOT) tablet 8.6 mg  1 Tab Oral QHS    sodium chloride (NS) flush 5-10 mL  5-10 mL IntraVENous Q8H    sodium chloride (NS) flush 5-10 mL  5-10 mL IntraVENous PRN       Review of Systems    Constitutional: negative for fever, chills, sweats  Cardiovascular: negative for chest pain, palpitations, syncope, edema  Gastrointestinal:  negative for dysphagia, reflux, vomiting, diarrhea, abdominal pain, or melena  Neurologic:  negative for focal weakness, numbness, headache    Objective:     Vitals:    02/25/17 0739 02/25/17 0825 02/25/17 1051 02/25/17 1452   BP: 132/71  149/85 (!) 171/96   Pulse: 62  71 70   Resp:   19 18   Temp: 98.4 °F (36.9 °C)  98 °F (36.7 °C) 98.3 °F (36.8 °C)   SpO2: 91% 96% 94% 94%   Weight:       Height:         Intake and Output:           Physical Exam:   Constitution:  the patient is well developed and in no acute distress  EENMT:  Sclera clear, pupils equal, oral mucosa moist  Respiratory: rhonchi bilateral  Cardiovascular:  RRR without M,G,R  Gastrointestinal: soft and non-tender; with positive bowel sounds. Musculoskeletal: warm without cyanosis. There is no lower leg edema. Skin:  no jaundice or rashes, no wounds   Neurologic: no gross neuro deficits     Psychiatric:  alert and oriented x 3    CHEST XRAY:       LAB  No results for input(s): GLUCPOC in the last 72 hours. No lab exists for component: GLPOC   No results for input(s): WBC, HGB, HCT, PLT, INR, HGBEXT, HCTEXT, PLTEXT, HGBEXT, HCTEXT, PLTEXT in the last 72 hours.     No lab exists for component: [place]  Recent Labs      02/25/17   1415  02/25/17   0650 02/25/17   0130   02/24/17   0015   02/23/17   0615   02/22/17   2125   NA   --    --    --    --    --    --   141   --    --    K   --    --    --    --    --    --   3.0*   --    --    CL   --    --    --    --    --    --   100   --    --    CO2   --    --    --    --    --    --   31   --    --    GLU   --    --    --    --    --    --   91   --    --    BUN   --    --    --    --    --    --   17   --    --    CREA   --    --    --    --    --    --   0.50*   --   0.69   MG   --    --    --    --    --    --   1.8   --    --    CA   --    --    --    --    --    --   8.3   --    --    TROIQ  0.11*  0.13*  0.16*   < >  0.48*   < >  1.32*   < >   --    BNPP   --    --    --    --   2234   --    --    --    --     < > = values in this interval not displayed. Assessment:  (Medical Decision Making)     Hospital Problems  Date Reviewed: 2/22/2017          Codes Class Noted POA    Acute respiratory failure with hypoxia [facility]) ICD-10-CM: J96.01  ICD-9-CM: 518.81  2/20/2017 Yes    On 3 l    * (Principal)Influenza ICD-10-CM: J11.1  ICD-9-CM: 487.1  2/20/2017 Yes    tamiflu    DNR (do not resuscitate) ICD-10-CM: Z66  ICD-9-CM: V49.86  2/20/2017 Yes        HCAP (healthcare-associated pneumonia) ICD-10-CM: J18.9  ICD-9-CM: 923  2/20/2017 Yes    Zosyn      Elevated troponin ICD-10-CM: R79.89  ICD-9-CM: 790.6  2/20/2017 Yes      Has UTI-  E coli           Plan:  (Medical Decision Making)   - continue ABX-zosyn for E coli and HCAP  - today last dose of tamilfu  - ? Disposition - lives in [facility]     More than 50% of the time documented was spent in face-to-face contact with the patient and in the care of the patient on the floor/unit where the patient is located.     Becca Birmingham MD

## 2017-02-26 LAB — TROPONIN I SERPL-MCNC: 0.13 NG/ML (ref 0.02–0.05)

## 2017-02-26 PROCEDURE — 94760 N-INVAS EAR/PLS OXIMETRY 1: CPT

## 2017-02-26 PROCEDURE — 74011250636 HC RX REV CODE- 250/636: Performed by: INTERNAL MEDICINE

## 2017-02-26 PROCEDURE — 77010033678 HC OXYGEN DAILY

## 2017-02-26 PROCEDURE — 74011250637 HC RX REV CODE- 250/637: Performed by: INTERNAL MEDICINE

## 2017-02-26 PROCEDURE — 65270000029 HC RM PRIVATE

## 2017-02-26 PROCEDURE — 99232 SBSQ HOSP IP/OBS MODERATE 35: CPT | Performed by: INTERNAL MEDICINE

## 2017-02-26 PROCEDURE — 84484 ASSAY OF TROPONIN QUANT: CPT | Performed by: INTERNAL MEDICINE

## 2017-02-26 PROCEDURE — 74011000250 HC RX REV CODE- 250: Performed by: INTERNAL MEDICINE

## 2017-02-26 PROCEDURE — 74011000258 HC RX REV CODE- 258: Performed by: INTERNAL MEDICINE

## 2017-02-26 PROCEDURE — 94640 AIRWAY INHALATION TREATMENT: CPT

## 2017-02-26 PROCEDURE — 36415 COLL VENOUS BLD VENIPUNCTURE: CPT | Performed by: INTERNAL MEDICINE

## 2017-02-26 RX ADMIN — OSELTAMIVIR PHOSPHATE 30 MG: 30 CAPSULE ORAL at 08:45

## 2017-02-26 RX ADMIN — HYDROCHLOROTHIAZIDE 25 MG: 25 TABLET ORAL at 08:45

## 2017-02-26 RX ADMIN — PREGABALIN 50 MG: 50 CAPSULE ORAL at 08:46

## 2017-02-26 RX ADMIN — ACETAMINOPHEN 650 MG: 325 TABLET, FILM COATED ORAL at 08:45

## 2017-02-26 RX ADMIN — Medication 10 ML: at 13:51

## 2017-02-26 RX ADMIN — ACETAMINOPHEN 650 MG: 325 TABLET, FILM COATED ORAL at 17:25

## 2017-02-26 RX ADMIN — DULOXETINE HYDROCHLORIDE 30 MG: 30 CAPSULE, DELAYED RELEASE ORAL at 08:46

## 2017-02-26 RX ADMIN — MEMANTINE HYDROCHLORIDE 10 MG: 5 TABLET ORAL at 18:00

## 2017-02-26 RX ADMIN — FOLIC ACID 1 MG: 1 TABLET ORAL at 08:46

## 2017-02-26 RX ADMIN — ALBUTEROL SULFATE 2.5 MG: 2.5 SOLUTION RESPIRATORY (INHALATION) at 15:27

## 2017-02-26 RX ADMIN — GUAIFENESIN 1200 MG: 600 TABLET, EXTENDED RELEASE ORAL at 08:46

## 2017-02-26 RX ADMIN — ESCITALOPRAM OXALATE 20 MG: 10 TABLET ORAL at 17:25

## 2017-02-26 RX ADMIN — GUAIFENESIN 1200 MG: 600 TABLET, EXTENDED RELEASE ORAL at 21:15

## 2017-02-26 RX ADMIN — PIPERACILLIN SODIUM,TAZOBACTAM SODIUM 4.5 G: 4; .5 INJECTION, POWDER, FOR SOLUTION INTRAVENOUS at 00:14

## 2017-02-26 RX ADMIN — FERROUS SULFATE TAB 325 MG (65 MG ELEMENTAL FE) 325 MG: 325 (65 FE) TAB at 08:45

## 2017-02-26 RX ADMIN — FENOFIBRATE 54 MG: 54 TABLET ORAL at 08:46

## 2017-02-26 RX ADMIN — ATENOLOL 100 MG: 50 TABLET ORAL at 08:46

## 2017-02-26 RX ADMIN — PIPERACILLIN SODIUM,TAZOBACTAM SODIUM 4.5 G: 4; .5 INJECTION, POWDER, FOR SOLUTION INTRAVENOUS at 21:41

## 2017-02-26 RX ADMIN — ALBUTEROL SULFATE 2.5 MG: 2.5 SOLUTION RESPIRATORY (INHALATION) at 20:57

## 2017-02-26 RX ADMIN — PREGABALIN 50 MG: 50 CAPSULE ORAL at 21:15

## 2017-02-26 RX ADMIN — MEMANTINE HYDROCHLORIDE 10 MG: 5 TABLET ORAL at 08:46

## 2017-02-26 RX ADMIN — ASPIRIN 81 MG 81 MG: 81 TABLET ORAL at 08:46

## 2017-02-26 RX ADMIN — ENOXAPARIN SODIUM 40 MG: 40 INJECTION SUBCUTANEOUS at 08:46

## 2017-02-26 RX ADMIN — Medication 10 ML: at 00:17

## 2017-02-26 RX ADMIN — OSELTAMIVIR PHOSPHATE 30 MG: 30 CAPSULE ORAL at 00:21

## 2017-02-26 RX ADMIN — ALBUTEROL SULFATE 2.5 MG: 2.5 SOLUTION RESPIRATORY (INHALATION) at 08:35

## 2017-02-26 RX ADMIN — METHYLPREDNISOLONE SODIUM SUCCINATE 40 MG: 40 INJECTION, POWDER, FOR SOLUTION INTRAMUSCULAR; INTRAVENOUS at 00:16

## 2017-02-26 RX ADMIN — PREGABALIN 50 MG: 50 CAPSULE ORAL at 00:22

## 2017-02-26 RX ADMIN — CLORAZEPATE DIPOTASSIUM 3.75 MG: 7.5 TABLET ORAL at 00:20

## 2017-02-26 RX ADMIN — GUAIFENESIN 1200 MG: 600 TABLET, EXTENDED RELEASE ORAL at 00:20

## 2017-02-26 RX ADMIN — CLORAZEPATE DIPOTASSIUM 3.75 MG: 7.5 TABLET ORAL at 08:45

## 2017-02-26 RX ADMIN — PREGABALIN 50 MG: 50 CAPSULE ORAL at 17:25

## 2017-02-26 RX ADMIN — Medication 10 ML: at 21:19

## 2017-02-26 RX ADMIN — PIPERACILLIN SODIUM,TAZOBACTAM SODIUM 4.5 G: 4; .5 INJECTION, POWDER, FOR SOLUTION INTRAVENOUS at 13:50

## 2017-02-26 RX ADMIN — SENNOSIDES 8.6 MG: 8.6 TABLET, FILM COATED ORAL at 00:20

## 2017-02-26 RX ADMIN — CLORAZEPATE DIPOTASSIUM 3.75 MG: 7.5 TABLET ORAL at 17:25

## 2017-02-26 RX ADMIN — PIPERACILLIN SODIUM,TAZOBACTAM SODIUM 4.5 G: 4; .5 INJECTION, POWDER, FOR SOLUTION INTRAVENOUS at 04:48

## 2017-02-26 RX ADMIN — SENNOSIDES 8.6 MG: 8.6 TABLET, FILM COATED ORAL at 21:16

## 2017-02-26 RX ADMIN — CLORAZEPATE DIPOTASSIUM 3.75 MG: 7.5 TABLET ORAL at 21:14

## 2017-02-26 NOTE — PROGRESS NOTES
[facility]  Admission Date: 2/20/2017             Daily Progress Note: 2/26/2017    The patient's chart is reviewed and the patient is discussed with the staff. 80 y.o. presents after being found down at her [facility]. Her daughter provides all history. She reportedly feeling bad for a few days with cough, sore throat, and fever. She did not report dyspnea. Went to an  417 [id number] 2/19/17, was diagnosed with flu, started on Tamiflu last night and was found down. CPR was started, EMS called, but on EMS arrival she was awake and conversant. Was started on O2 and brought to the ER. CXR consistent with pneumonia, she was hypoxic and placed on Opti-flow. Troponin was elevated around 1.  She was admitted, remained confused and DNR status confirmed  She is some better but still coughing and congested on 3 L o2  Subjective:     No complains   has not been seen by PT since 2/23/  On 2L NC     Current Facility-Administered Medications   Medication Dose Route Frequency    albuterol (PROVENTIL VENTOLIN) nebulizer solution 2.5 mg  2.5 mg Nebulization TID RT    albuterol (PROVENTIL VENTOLIN) nebulizer solution 2.5 mg  2.5 mg Nebulization Q6H PRN    guaiFENesin ER (MUCINEX) tablet 1,200 mg  1,200 mg Oral Q12H    NUTRITIONAL SUPPORT ELECTROLYTE PRN ORDERS   Does Not Apply PRN    enoxaparin (LOVENOX) injection 40 mg  40 mg SubCUTAneous Q24H    piperacillin-tazobactam (ZOSYN) 4.5 g in 0.9% sodium chloride (MBP/ADV) 100 mL  4.5 g IntraVENous Q8H    acetaminophen (TYLENOL) tablet 650 mg  650 mg Oral Q6H PRN    aspirin chewable tablet 81 mg  81 mg Oral DAILY    atenolol (TENORMIN) tablet 100 mg  100 mg Oral DAILY    clorazepate (TRANXENE) tablet 3.75 mg  3.75 mg Oral TID    DULoxetine (CYMBALTA) capsule 30 mg  30 mg Oral DAILY    escitalopram oxalate (LEXAPRO) tablet 20 mg  20 mg Oral DAILY WITH DINNER    fenofibrate (LOFIBRA) tablet 54 mg  54 mg Oral DAILY    ferrous sulfate tablet 325 mg  1 Tab Oral DAILY WITH BREAKFAST    folic acid (FOLVITE) tablet 1 mg  1 mg Oral DAILY    hydroCHLOROthiazide (HYDRODIURIL) tablet 25 mg  25 mg Oral DAILY    memantine (NAMENDA) tablet 10 mg  10 mg Oral BID    pregabalin (LYRICA) capsule 50 mg  50 mg Oral TID    senna (SENOKOT) tablet 8.6 mg  1 Tab Oral QHS    sodium chloride (NS) flush 5-10 mL  5-10 mL IntraVENous Q8H    sodium chloride (NS) flush 5-10 mL  5-10 mL IntraVENous PRN       Review of Systems    Constitutional: negative for fever, chills, sweats  Cardiovascular: negative for chest pain, palpitations, syncope, edema  Gastrointestinal:  negative for dysphagia, reflux, vomiting, diarrhea, abdominal pain, or melena  Neurologic:  negative for focal weakness, numbness, headache    Objective:     Vitals:    02/26/17 0259 02/26/17 0718 02/26/17 0836 02/26/17 1028   BP: 155/87 150/76  150/80   Pulse: 70 69  66   Resp: 18 18 17   Temp: 97.5 °F (36.4 °C) 98.1 °F (36.7 °C)  98.9 °F (37.2 °C)   SpO2: 96% 95% 97% 96%   Weight:       Height:         Intake and Output:      02/26 0701 - 02/26 1900  In: 175 [P.O.:175]  Out: -     Physical Exam:   Constitution:  the patient is well developed and in no acute distress  EENMT:  Sclera clear, pupils equal, oral mucosa moist  Respiratory: clear  Cardiovascular:  RRR without M,G,R  Gastrointestinal: soft and non-tender; with positive bowel sounds. Musculoskeletal: warm without cyanosis. There is no lower leg edema. Skin:  no jaundice or rashes, no wounds   Neurologic: no gross neuro deficits     Psychiatric:  alert and oriented x 3    CHEST XRAY:       LAB  No results for input(s): GLUCPOC in the last 72 hours. No lab exists for component: GLPOC   No results for input(s): WBC, HGB, HCT, PLT, INR, HGBEXT, HCTEXT, PLTEXT in the last 72 hours.     No lab exists for component: INREXT  Recent Labs      02/26/17   0104  02/25/17   2000  02/25/17   1415   02/24/17   0015   TROIQ  0.13*  0.09*  0.11*   < >  0.48*   BNPP   --    --    -- --   2234    < > = values in this interval not displayed. No results for input(s): PH, PCO2, PO2, HCO3 in the last 72 hours. No results for input(s): LCAD, LAC in the last 72 hours. Assessment:  (Medical Decision Making)     Hospital Problems  Date Reviewed: 2/22/2017          Codes Class Noted POA    UTI (urinary tract infection)   on zosyn  ICD-10-CM: N39.0  ICD-9-CM: 599.0  2/25/2017 Unknown        Sepsis ([facility] [address] ICD-10-CM: A41.9  ICD-9-CM: 038.9, 995.91  2/24/2017 Unknown        Acute respiratory failure with hypoxia ([facility] [address]   still on 02  ICD-10-CM: J96.01  ICD-9-CM: 518.81  2/20/2017 Yes        * (Principal)Influenza   finished  tamiflu  ICD-10-CM: J11.1  ICD-9-CM: 487.1  2/20/2017 Yes        DNR (do not resuscitate) ICD-10-CM: Z66  ICD-9-CM: V49.86  2/20/2017 Yes        HCAP (healthcare-associated pneumonia) ICD-10-CM: J18.9  ICD-9-CM: 633  2/20/2017 Yes        Elevated troponin ICD-10-CM: R79.89  ICD-9-CM: 790.6  2/20/2017 Yes              Plan:  (Medical Decision Making)     -- Radha D 6 /7   - PT  - case management for discharge planning -she lives in [place] but PT recommends STR    More than 50% of the time documented was spent in face-to-face contact with the patient and in the care of the patient on the floor/unit where the patient is located.     Reva Roldan MD

## 2017-02-26 NOTE — PROGRESS NOTES
Notified  Brian Barth MD of troponin critical level 0.13. New order to from MD to discontinue troponin lab order.

## 2017-02-27 LAB
ANION GAP BLD CALC-SCNC: 6 MMOL/L (ref 7–16)
BUN SERPL-MCNC: 19 MG/DL (ref 8–23)
CALCIUM SERPL-MCNC: 8.8 MG/DL (ref 8.3–10.4)
CHLORIDE SERPL-SCNC: 98 MMOL/L (ref 98–107)
CO2 SERPL-SCNC: 37 MMOL/L (ref 21–32)
CREAT SERPL-MCNC: 0.68 MG/DL (ref 0.6–1)
GLUCOSE SERPL-MCNC: 77 MG/DL (ref 65–100)
POTASSIUM SERPL-SCNC: 3.3 MMOL/L (ref 3.5–5.1)
SODIUM SERPL-SCNC: 141 MMOL/L (ref 136–145)

## 2017-02-27 PROCEDURE — 74011000250 HC RX REV CODE- 250: Performed by: INTERNAL MEDICINE

## 2017-02-27 PROCEDURE — 99232 SBSQ HOSP IP/OBS MODERATE 35: CPT | Performed by: INTERNAL MEDICINE

## 2017-02-27 PROCEDURE — 97530 THERAPEUTIC ACTIVITIES: CPT

## 2017-02-27 PROCEDURE — 92610 EVALUATE SWALLOWING FUNCTION: CPT

## 2017-02-27 PROCEDURE — 97110 THERAPEUTIC EXERCISES: CPT

## 2017-02-27 PROCEDURE — 80048 BASIC METABOLIC PNL TOTAL CA: CPT | Performed by: INTERNAL MEDICINE

## 2017-02-27 PROCEDURE — 86580 TB INTRADERMAL TEST: CPT | Performed by: PHYSICIAN ASSISTANT

## 2017-02-27 PROCEDURE — 74011000302 HC RX REV CODE- 302: Performed by: PHYSICIAN ASSISTANT

## 2017-02-27 PROCEDURE — 74011250636 HC RX REV CODE- 250/636: Performed by: INTERNAL MEDICINE

## 2017-02-27 PROCEDURE — 74011250637 HC RX REV CODE- 250/637: Performed by: INTERNAL MEDICINE

## 2017-02-27 PROCEDURE — 65270000029 HC RM PRIVATE

## 2017-02-27 PROCEDURE — 36415 COLL VENOUS BLD VENIPUNCTURE: CPT | Performed by: INTERNAL MEDICINE

## 2017-02-27 PROCEDURE — 74011000258 HC RX REV CODE- 258: Performed by: INTERNAL MEDICINE

## 2017-02-27 PROCEDURE — 77010033678 HC OXYGEN DAILY

## 2017-02-27 PROCEDURE — 94760 N-INVAS EAR/PLS OXIMETRY 1: CPT

## 2017-02-27 PROCEDURE — 94640 AIRWAY INHALATION TREATMENT: CPT

## 2017-02-27 RX ADMIN — ATENOLOL 100 MG: 50 TABLET ORAL at 09:59

## 2017-02-27 RX ADMIN — PREGABALIN 50 MG: 50 CAPSULE ORAL at 10:00

## 2017-02-27 RX ADMIN — ALBUTEROL SULFATE 2.5 MG: 2.5 SOLUTION RESPIRATORY (INHALATION) at 20:44

## 2017-02-27 RX ADMIN — ALBUTEROL SULFATE 2.5 MG: 2.5 SOLUTION RESPIRATORY (INHALATION) at 09:41

## 2017-02-27 RX ADMIN — ASPIRIN 81 MG 81 MG: 81 TABLET ORAL at 09:59

## 2017-02-27 RX ADMIN — PIPERACILLIN SODIUM,TAZOBACTAM SODIUM 4.5 G: 4; .5 INJECTION, POWDER, FOR SOLUTION INTRAVENOUS at 22:02

## 2017-02-27 RX ADMIN — ALBUTEROL SULFATE 2.5 MG: 2.5 SOLUTION RESPIRATORY (INHALATION) at 14:07

## 2017-02-27 RX ADMIN — PREGABALIN 50 MG: 50 CAPSULE ORAL at 17:13

## 2017-02-27 RX ADMIN — ACETAMINOPHEN 650 MG: 325 TABLET, FILM COATED ORAL at 09:59

## 2017-02-27 RX ADMIN — Medication 10 ML: at 22:02

## 2017-02-27 RX ADMIN — TUBERCULIN PURIFIED PROTEIN DERIVATIVE 5 UNITS: 5 INJECTION INTRADERMAL at 17:29

## 2017-02-27 RX ADMIN — GUAIFENESIN 1200 MG: 600 TABLET, EXTENDED RELEASE ORAL at 09:59

## 2017-02-27 RX ADMIN — Medication 10 ML: at 05:34

## 2017-02-27 RX ADMIN — CLORAZEPATE DIPOTASSIUM 3.75 MG: 7.5 TABLET ORAL at 22:01

## 2017-02-27 RX ADMIN — ESCITALOPRAM OXALATE 20 MG: 10 TABLET ORAL at 17:13

## 2017-02-27 RX ADMIN — HYDROCHLOROTHIAZIDE 25 MG: 25 TABLET ORAL at 09:59

## 2017-02-27 RX ADMIN — PREGABALIN 50 MG: 50 CAPSULE ORAL at 22:02

## 2017-02-27 RX ADMIN — FERROUS SULFATE TAB 325 MG (65 MG ELEMENTAL FE) 325 MG: 325 (65 FE) TAB at 09:59

## 2017-02-27 RX ADMIN — DULOXETINE HYDROCHLORIDE 30 MG: 30 CAPSULE, DELAYED RELEASE ORAL at 09:59

## 2017-02-27 RX ADMIN — MEMANTINE HYDROCHLORIDE 10 MG: 5 TABLET ORAL at 09:59

## 2017-02-27 RX ADMIN — SENNOSIDES 8.6 MG: 8.6 TABLET, FILM COATED ORAL at 22:02

## 2017-02-27 RX ADMIN — PIPERACILLIN SODIUM,TAZOBACTAM SODIUM 4.5 G: 4; .5 INJECTION, POWDER, FOR SOLUTION INTRAVENOUS at 14:16

## 2017-02-27 RX ADMIN — FOLIC ACID 1 MG: 1 TABLET ORAL at 09:59

## 2017-02-27 RX ADMIN — GUAIFENESIN 1200 MG: 600 TABLET, EXTENDED RELEASE ORAL at 22:01

## 2017-02-27 RX ADMIN — MEMANTINE HYDROCHLORIDE 10 MG: 5 TABLET ORAL at 17:13

## 2017-02-27 RX ADMIN — CLORAZEPATE DIPOTASSIUM 3.75 MG: 7.5 TABLET ORAL at 09:59

## 2017-02-27 RX ADMIN — ENOXAPARIN SODIUM 40 MG: 40 INJECTION SUBCUTANEOUS at 10:00

## 2017-02-27 RX ADMIN — FENOFIBRATE 54 MG: 54 TABLET ORAL at 09:59

## 2017-02-27 RX ADMIN — CLORAZEPATE DIPOTASSIUM 3.75 MG: 7.5 TABLET ORAL at 17:13

## 2017-02-27 RX ADMIN — PIPERACILLIN SODIUM,TAZOBACTAM SODIUM 4.5 G: 4; .5 INJECTION, POWDER, FOR SOLUTION INTRAVENOUS at 05:33

## 2017-02-27 RX ADMIN — Medication 5 ML: at 17:16

## 2017-02-27 NOTE — PROGRESS NOTES
Care Management Interventions  Mode of Transport at Discharge: BLS  Transition of Care Consult (CM Consult): Discharge Planning  Discharge Durable Medical Equipment: No  Physical Therapy Consult: Yes  Occupational Therapy Consult: Yes  Speech Therapy Consult: Yes  Current Support Network: Family Lives Meme Ortega ([facility] [place])  Confirm Follow Up Transport: Family  Plan discussed with Pt/Family/Caregiver: Yes  Freedom of Choice Offered: Yes  Discharge Location  Discharge Placement: Rehab Unit Subacute    Pt is a [address] yo female admitted due to influenza. Pt is from an [place] and would benefit from STR services prior to return there. AMMON met with the pt and dtr to discuss choice of subacute rehab facility. They requested a referral to [facility]. AMMON provided the dtr with a list of facilities to review for additional choices in case [facility] not have any openings. Referral submitted requesting a bed for Wednesday (PPD just ordered and placed today). Awaiting a response. SW following.

## 2017-02-27 NOTE — PROGRESS NOTES
Problem: Dysphagia (Adult)  Goal: *Speech Goal: (INSERT TEXT)  STG: Pt will participate with modified barium swallow study x1  LTG: Pt will tolerate the least restrictive diet at discharge without respiratory compromise  SPEECH LANGUAGE PATHOLOGY: BEDSIDE SWALLOW NOTE: INITIAL ASSESSMENT     NAME/AGE/GENDER: Arlen Webb is a 80 y.o. female  DATE: 2/27/2017  PRIMARY DIAGNOSIS: Influenza       ICD-10: Treatment Diagnosis: dysphagia; unspecified R13.10  INTERDISCIPLINARY COLLABORATION: Registered Nurse  PRECAUTIONS/ALLERGIES: Neurontin [gabapentin]   ASSESSMENT:   Based on the objective data described below, Ms. Webb presents with significant congested coughing prior to po trials and throughout the assessment but not consistently after each presentation of thin liquids. She has influenza and pneumonia. She refused puree. Tolerated majority of mixed trials without overt signs/sx of aspiration. Cough x1; question baseline versus aspiration. Tolerated heather cracker trials x2 with fairly timely mastication despite limited dentition and no overt signs/sx of aspiration. Patient without immediate signs/sx of aspiration with thin liquids but did have an occasional delayed cough. Unable to definitively determine baseline versus aspiration via bedside assessment alone due to extent of baseline coughing. Patient's nose running slightly at the conclusion of the session. Recommend continue current diet. Patient would benefit from modified barium swallow study (MBS) to further assess pharyngeal swallow. Patient will benefit from skilled intervention to address the below impairments. ?????? ? ? This section established at most recent assessment??????????  PROBLEM LIST (Impairments causing functional limitations):  1. Dysphagia  REHABILITATION POTENTIAL FOR STATED GOALS: GOOD      PLAN OF CARE:   Patient will benefit from skilled intervention to address the following impairments.   RECOMMENDATIONS AND PLANNED INTERVENTIONS (Benefits and precautions of therapy have been discussed with the patient.):  · continue prescribed diet  MEDICATIONS:  · in puree  COMPENSATORY STRATEGIES/MODIFICATIONS INCLUDING:  · Small sips and bites  · Fully upright  OTHER RECOMMENDATIONS (including follow up treatment recommendations): · Family training/education  · Patient education  RECOMMENDED DIET MODIFICATIONS DISCUSSED WITH:  · Nursing  · Patient  FREQUENCY/DURATION: Continue to follow patient 3x a week or until short term goals are met to address above goals. RECOMMENDED REHABILITATION/EQUIPMENT: (at time of discharge pending progress):   to be determined. SUBJECTIVE:   Very polite and appreciative. History of Present Injury/Illness: Ms. Webb  has a past medical history of Arthritis; Cancer ([address] Hypertension; Ill-defined condition; Ill-defined condition; Ill-defined condition; Neurological disorder; and Psychiatric disorder. . She also  has a past surgical history that includes gyn and heent. Present Symptoms: cough  Pain Intensity 1: 0  Current Medications:   No current facility-administered medications on file prior to encounter. Current Outpatient Prescriptions on File Prior to Encounter   Medication Sig Dispense Refill    HYDROcodone-acetaminophen (NORCO) 5-325 mg per tablet Take 1 Tab by mouth every six (6) hours as needed for Pain. Max Daily Amount: 4 Tabs. 6 Tab 0    cranberry 500 mg capsule Take 1,000 mg by mouth daily.  DULoxetine (CYMBALTA) 30 mg capsule Take 30 mg by mouth daily.  escitalopram oxalate (LEXAPRO) 20 mg tablet Take 20 mg by mouth daily (with dinner).  fenofibrate nanocrystallized (TRICOR) 48 mg tablet Take 48 mg by mouth daily.  ferrous sulfate 325 mg (65 mg iron) tablet Take  by mouth Daily (before breakfast).  folic acid (FOLVITE) 1 mg tablet Take 1 mg by mouth daily.  hydrochlorothiazide (HYDRODIURIL) 25 mg tablet Take 25 mg by mouth daily.  Iron Fum & PS Cmp-Vit C-Niacin 125-40-3 mg cap Take 1 Cap by mouth daily.  pregabalin (LYRICA) 50 mg capsule Take 50 mg by mouth three (3) times daily.  memantine (NAMENDA) 10 mg tablet Take 10 mg by mouth two (2) times a day.  cholecalciferol, vitamin d3, 400 unit cap Take 1 Tab by mouth two (2) times a day.  clorazepate (TRANXENE) 3.75 mg tablet Take 3.75 mg by mouth three (3) times daily.  acetaminophen 500 mg cap Take 1 Tab by mouth every four (4) hours as needed.  sennosides 8.6 mg cap Take 1 Tab by mouth nightly as needed.  aspirin 81 mg chewable tablet Take 81 mg by mouth daily.  atenolol (TENORMIN) 100 mg tablet Take 100 mg by mouth daily.  loperamide (IMMODIUM) 2 mg tablet Take 2 mg by mouth four (4) times daily as needed for Diarrhea. Current Dietary Status:  Regular textures           History of reflux:  NO   Social History/Home Situation: [facility]  Home Environment: [address] [place] [address] Road Name: [place] [facility] [place]  # Steps to Enter: 0  One/Two Story Residence: One story  Living Alone: No  Support Systems: Child(abimael), Family member(s), Assisted living  Patient Expects to be Discharged to<IPADD> Unknown  Current DME Used/Available at Home: Wheelchair, Walker, rolling  Tub or Shower Type: Shower      OBJECTIVE:   Respiratory Status:  Nasal cannula  3 l/min  CXR Results:New retrocardiac opacities which appear to represent airspace changes and a  small effusion. Symptoms of pneumonia should be excluded given the unilateral  distribution. Less likely, this may represent an atypical presentation of heart  failure given the associated cardiomegaly.  Recommend clinical correlation  MRI/CT Results: n/a  Oral Motor Structure/Speech:  Oral-Motor Structure/Motor Speech  Labial: No impairment  Dentition: Limited, Natural  Oral Hygiene: fair  Lingual: No impairment     Cognitive and Communication Status:  Neurologic State: Alert  Orientation Level: Oriented to person;Oriented to place; Disoriented to time  Cognition: Follows commands  Perception: Appears intact     Safety/Judgement: Decreased insight into deficits; Fall prevention     BEDSIDE SWALLOW EVALUATION  Oral Assessment:  Oral Assessment  Labial: No impairment  Dentition: Limited;Natural  Oral Hygiene: fair  Lingual: No impairment  P.O. Trials:  Patient Position: upright in bed     The patient was given tsp to straw amounts of the following:   Consistency Presented: Thin liquid; Solid;Mixed consistency  How Presented: Cup/sip;Spoon;Straw;Self-fed/presented; Successive swallows     ORAL PHASE:  Bolus Acceptance: No impairment  Bolus Formation/Control: No impairment  Propulsion: Delayed (# of seconds) (mild)     Oral Residue: None     PHARYNGEAL PHASE:  Initiation of Swallow: Delayed (# of seconds) (mild)  Laryngeal Elevation: Functional  Aspiration Signs/Symptoms: Delayed cough/throat clear; Infiltrate on chest xray  Vocal Quality: No impairment                 OTHER OBSERVATIONS:  Rate/bite size: Impaired   Endurance:  Impaired          Tool Used: Dysphagia Outcome and Severity Scale (MILENA)     Score Comments   Normal Diet  [ ] 7 With no strategies or extra time needed   Functional Swallow  [ ] 6 May have mild oral or pharyngeal delay         Mild Dysphagia     [X] 5 Which may require one diet consistency restricted (those who demonstrate penetration which is entirely cleared on MBS would be included)   Mild-Moderate Dysphagia  [ ] 4 With 1-2 diet consistencies restricted         Moderate Dysphagia  [ ] 3 With 2 or more diet consistencies restricted         Moderately Severe Dysphagia  [ ] 2 With partial PO strategies (trials with ST only)         Severe Dysphagia  [ ] 1 With inability to tolerate any PO safely            Score:  Initial: 5 Most Recent: X (Date: -- )   Interpretation of Tool: The Dysphagia Outcome and Severity Scale (MILENA) is a simple, easy-to-use, 7-point scale developed to systematically rate the functional severity of dysphagia based on objective assessment and make recommendations for diet level, independence level, and type of nutrition. Score 7 6 5 4 3 2 1   Modifier CH CI CJ CK CL CM CN   · Swallowing:               - CURRENT STATUS:           CJ - 20%-39% impaired, limited or restricted               - GOAL STATUS:                   CI - 1%-19% impaired, limited or restricted               - D/C STATUS:                       ---------------To be determined---------------  [place] / [place] / Product Type: Medicare /       TREATMENT:         (In addition to Assessment/Re-Assessment sessions the following treatments were rendered)  Assessment/Reassessment only, no treatment provided today     LARYNGEAL / PHARYNGEAL EXERCISES:                                                                                                                                      __________________________________________________________________________________________________  Safety:   After treatment position/precautions:  · RN notified  · Upright in Bed  Progression/Medical Necessity:   · Skilled intervention continues to be required due to persistent signs and symptoms of aspiration. Compliance with Program/Exercises: Will assess as treatment progresses. Reason for Continuation of Services/Other Comments:  · Patient continues to require skilled intervention due to patient unable to attend/participate in therapy as expected. Recommendations/Intent for next treatment session: \"Treatment next visit will focus on modified barium swallow study\".     Total Treatment Duration:  Time In: 1113  Time Out: 349 Wilfredo Peacock MS, SLP

## 2017-02-27 NOTE — PROGRESS NOTES
Problem: Mobility Impaired (Adult and Pediatric)  Goal: *Acute Goals and Plan of Care (Insert Text)  STG:  (1.)Ms. Webb will move from supine to sit and sit to supine , scoot up and down and roll side to side with CONTACT GUARD ASSIST within 5 day(s). (2.)Ms. Webb will transfer from bed to chair and chair to bed with MINIMAL ASSIST using the least restrictive device within 5 day(s). (3.)Ms. Webb will ambulate with MINIMAL ASSIST for 5 feet with the least restrictive device within 5 day(s). (4.)Ms. Webb will perform LE exercises with 3 to 5 cues for form within 3 days to improve strength for functional transfers and ambulation. LTG:  (1.)Ms. Webb will move from supine to sit and sit to supine , scoot up and down and roll side to side in bed with STAND BY ASSIST within 10 day(s). (2.)Ms. Webb will transfer from bed to chair and chair to bed with CONTACT GUARD ASSIST using the least restrictive device within 10 day(s). (3.)Ms. Webb will ambulate with CONTACT GUARD ASSIST for 10 feet with the least restrictive device within 10 day(s). ________________________________________________________________________________________________      PHYSICAL THERAPY: Daily Note and PM 2/27/2017  INPATIENT: Hospital Day: 8  [place] / Product Type: Medicare /      NAME/AGE/GENDER: Maribel Malik is a 80 y.o. female   PRIMARY DIAGNOSIS: Influenza Influenza Influenza        ICD-10: Treatment Diagnosis:       · Difficulty in walking, Not elsewhere classified (R26.2)   Precaution/Allergies:  Neurontin [gabapentin]       ASSESSMENT:      Ms. Webb is a 80 y.o. female with influenza. She lives in assisted living at [address] [place] [address]. Pt is supine in bed on arrival.  She is agreeable to PT. Pt sat EOB with min assist and tolerated sitting EOB to perform exercises.   Pt stood x2 with min/mod assist.  Pt was able to take a few steps to [facility], but would not take steps over to the chair. Pt returned supine with needs in reach. This section established at most recent assessment   PROBLEM LIST (Impairments causing functional limitations):  1. Decreased Strength  2. Decreased ADL/Functional Activities  3. Decreased Transfer Abilities  4. Decreased Ambulation Ability/Technique  5. Decreased Balance  6. Decreased Activity Tolerance  7. Decreased Pacing Skills  8. Decreased Knowledge of Precautions  9. Decreased [place] with Home Exercise Program  10. Decreased Cognition    INTERVENTIONS PLANNED: (Benefits and precautions of physical therapy have been discussed with the patient.)  1. Balance Exercise  2. Bed Mobility  3. Family Education  4. Gait Training  5. Home Exercise Program (HEP)  6. Therapeutic Activites  7. Therapeutic Exercise/Strengthening  8. Transfer Training  9. patient education  10. Group Therapy      TREATMENT PLAN: Frequency/Duration: 3 times a week for duration of hospital stay  Rehabilitation Potential For Stated Goals: FAIR      RECOMMENDED REHABILITATION/EQUIPMENT: (at time of discharge pending progress): Continue Skilled Therapy. HISTORY:   History of Present Injury/Illness (Reason for Referral):  Per MD Note: \"Patient is a 80 y.o.  female presents after being found down at her [facility]. Her daughter provides all history. She reportedly began feeling bad over the past few days with increased cough, sore throat, and fever. She did not report any dyspnea. She went to an  yesterday and was diagnosed with flu, started on tamiflu last night. Staff reportedly found her down about 6pm this evening. CPR was apparently started and EMS called, but on EMS arrival the patient was awake and conversant. She was started on O2 and brought to the ER. Her CXR was consistent with pna, she was hypoxic and is currently on optiflow. Troponin was elevated around 1. We have been asked to admit her. The patient remains confused above her baseline.  The daughter states that she is DNR.\"  Past Medical History/Comorbidities:   Ms. Webb  has a past medical history of Arthritis; Cancer ([address] Hypertension; Ill-defined condition; Ill-defined condition; Ill-defined condition; Neurological disorder; and Psychiatric disorder. Ms. Webb  has a past surgical history that includes gyn and heent. Social History/Living Environment:   [facility] [address] [address] [place]: [place]  # Steps to Enter: 0  One/Two Story Residence: One story  Living Alone: No  Support Systems: Child(abimael), Family member(s), Assisted living  Patient Expects to be Discharged to[de-identified] Unknown  Current DME Used/Available at Home: Wheelchair, Walker, rolling  Tub or Shower Type: Shower  Prior Level of Function/Work/Activity:  Patient confused, no family to report prior level of function. She reports using rolling walker for short ambulation and wheelchair. Number of Personal Factors/Comorbidities that affect the Plan of Care: 3+: HIGH COMPLEXITY   EXAMINATION:   Most Recent Physical Functioning:   Gross Assessment:                  Posture:     Balance:    Bed Mobility:  Supine to Sit: Minimum assistance  Sit to Supine: Moderate assistance  Wheelchair Mobility:     Transfers:  Sit to Stand: Minimum assistance  Stand to Sit: Minimum assistance  Bed to Chair: Minimum assistance  Gait:     Base of Support: Center of gravity altered  Speed/Anita: Shuffled; Slow  Step Length: Left shortened;Right shortened  Stance: Left increased;Right increased  Gait Abnormalities: Decreased step clearance  Distance (ft): 5 Feet (ft) (to [facility])  Assistive Device: Walker, rolling  Ambulation - Level of Assistance: Minimal assistance  Interventions: Safety awareness training;Verbal cues       Body Structures Involved:  1. Heart  2. Lungs  3. Muscles Body Functions Affected:  1. Cardio  2. Respiratory  3. Neuromusculoskeletal  4. Movement Related Activities and Participation Affected:  1.  Learning and Applying Knowledge  2. Mobility  3. Self Care  4. Domestic Life  5. Interpersonal Interactions and Relationships  6. Community, Social and [place]   Number of elements that affect the Plan of Care: 4+: HIGH COMPLEXITY   CLINICAL PRESENTATION:   Presentation: Stable and uncomplicated: LOW COMPLEXITY   CLINICAL DECISION MAKIN [facility] Mobility Inpatient Short Form  How much difficulty does the patient currently have. .. Unable A Lot A Little None   1. Turning over in bed (including adjusting bedclothes, sheets and blankets)? [ ] 1   [X] 2   [ ] 3   [ ] 4   2. Sitting down on and standing up from a chair with arms ( e.g., wheelchair, bedside commode, etc.)   [ ] 1   [X] 2   [ ] 3   [ ] 4   3. Moving from lying on back to sitting on the side of the bed? [ ] 1   [X] 2   [ ] 3   [ ] 4   How much help from another person does the patient currently need. .. Total A Lot A Little None   4. Moving to and from a bed to a chair (including a wheelchair)? [ ] 1   [X] 2   [ ] 3   [ ] 4   5. Need to walk in hospital room? [ ] 1   [X] 2   [ ] 3   [ ] 4   6. Climbing 3-5 steps with a railing? [X] 1   [ ] 2   [ ] 3   [ ] 4   © , Trustees of [address] [place] [address] [address] [id number], under license to [employer]. All rights reserved    Score:  Initial: 11 Most Recent: X (Date: -- )     Interpretation of Tool:  Represents activities that are increasingly more difficult (i.e. Bed mobility, Transfers, Gait).        Score 24 23 22-20 19-15 14-10 9-7 6       Modifier CH CI CJ CK CL CM CN         · Mobility - Walking and Moving Around:               - CURRENT STATUS:    CL - 60%-79% impaired, limited or restricted               - GOAL STATUS:           CK - 40%-59% impaired, limited or restricted               - D/C STATUS:                       ---------------To be determined---------------  [place] / [place] / Product Type: Medicare /       Medical Necessity:     · Patient demonstrates fair rehab potential due to higher previous functional level. Reason for Services/Other Comments:  · Patient continues to require modification of therapeutic interventions to increase complexity of exercises. Use of outcome tool(s) and clinical judgement create a POC that gives a: Questionable prediction of patient's progress: MODERATE COMPLEXITY                 TREATMENT:   (In addition to Assessment/Re-Assessment sessions the following treatments were rendered)   Pre-treatment Symptoms/Complaints:  none  Pain: Initial:      Post Session:  C/o fatigue, no pain      Therapeutic Activity: (    15): Therapeutic activities including Bed transfers to improve mobility, strength and balance. Required minimal Safety awareness training;Verbal cues to promote dynamic balance in standing. Therapeutic Exercise: (15 Minutes):  Exercises per grid below to improve mobility, strength and balance. Required minimal verbal cues to promote proper body alignment, promote proper body posture and promote proper body mechanics. Progressed repetitions as indicated. Date:  2/27/17 Date:   Date:     Activity/Exercise Parameters Parameters Parameters   Ankle pumps 20     LAQs 10     marching 10     Hip ABD/ADD 10 supine     SLR 5 supine     Heel slides 10 supine             Braces/Orthotics/Lines/Etc:   · O2 Device: Nasal cannula  Treatment/Session Assessment:    · Response to Treatment:  Patient tolerated treatment well. · Interdisciplinary Collaboration:  · Physical Therapist  · Registered Nurse  · After treatment position/precautions:  · Supine in bed, Bed/Chair-wheels locked, Bed in low position, Call light within reach and RN notified  · Compliance with Program/Exercises: Will assess as treatment progresses. · Recommendations/Intent for next treatment session: \"Next visit will focus on advancements to more challenging activities and reduction in assistance provided\".   Total Treatment Duration:  PT Patient Time In/Time Out  Time In: 1305  Time Out: [place], PTA

## 2017-02-27 NOTE — PROGRESS NOTES
[facility]  Admission Date: 2/20/2017             Daily Progress Note: 2/27/2017    The patient's chart is reviewed and the patient is discussed with the staff. Pt is a 79 yo [place] female with a hx of dementia who was seen by bMD 360 on 2/19 and diagnosed with flu . She returned to her [facility] and on 2/20 was found down at [facility]. EMS summoned and CPR initiated. On arrival by EMS pt awake and converstant. She was started on O2 and brought to ER. She was found to have PNA on CXR. She also has E coli UTI and is on Zosyn. PT consulted but have not seen pt. OT has seen but not since 2/24. Subjective:     Pt lying in bed with 2L O2. Nasal cannula blowing on her lip. Pt's RA sat 93%. Pt reports that cough is better.      Current Facility-Administered Medications   Medication Dose Route Frequency    albuterol (PROVENTIL VENTOLIN) nebulizer solution 2.5 mg  2.5 mg Nebulization TID RT    albuterol (PROVENTIL VENTOLIN) nebulizer solution 2.5 mg  2.5 mg Nebulization Q6H PRN    guaiFENesin ER (MUCINEX) tablet 1,200 mg  1,200 mg Oral Q12H    NUTRITIONAL SUPPORT ELECTROLYTE PRN ORDERS   Does Not Apply PRN    enoxaparin (LOVENOX) injection 40 mg  40 mg SubCUTAneous Q24H    piperacillin-tazobactam (ZOSYN) 4.5 g in 0.9% sodium chloride (MBP/ADV) 100 mL  4.5 g IntraVENous Q8H    acetaminophen (TYLENOL) tablet 650 mg  650 mg Oral Q6H PRN    aspirin chewable tablet 81 mg  81 mg Oral DAILY    atenolol (TENORMIN) tablet 100 mg  100 mg Oral DAILY    clorazepate (TRANXENE) tablet 3.75 mg  3.75 mg Oral TID    DULoxetine (CYMBALTA) capsule 30 mg  30 mg Oral DAILY    escitalopram oxalate (LEXAPRO) tablet 20 mg  20 mg Oral DAILY WITH DINNER    fenofibrate (LOFIBRA) tablet 54 mg  54 mg Oral DAILY    ferrous sulfate tablet 325 mg  1 Tab Oral DAILY WITH BREAKFAST    folic acid (FOLVITE) tablet 1 mg  1 mg Oral DAILY    hydroCHLOROthiazide (HYDRODIURIL) tablet 25 mg  25 mg Oral DAILY    memantine (NAMENDA) tablet 10 mg  10 mg Oral BID    pregabalin (LYRICA) capsule 50 mg  50 mg Oral TID    senna (SENOKOT) tablet 8.6 mg  1 Tab Oral QHS    sodium chloride (NS) flush 5-10 mL  5-10 mL IntraVENous Q8H    sodium chloride (NS) flush 5-10 mL  5-10 mL IntraVENous PRN       Review of Systems   Unobtainable due to patient status. Objective:     Vitals:    02/26/17 2057 02/27/17 0032 02/27/17 0258 02/27/17 0829   BP:  125/73 154/89 140/76   Pulse:  64 64 (!) 59   Resp:  17 16 19   Temp:  98.2 °F (36.8 °C) 98 °F (36.7 °C) 98 °F (36.7 °C)   SpO2: 98% 99% 97% 96%   Weight:       Height:         Intake and Output:   02/25 1901 - 02/27 0700  In: 175 [P.O.:175]  Out: -   02/27 0701 - 02/27 1900  In: 120 [P.O.:120]  Out: -     Physical Exam:   Constitution:  the patient is well developed and in no acute distress, on RA-(NC on but not in nares)  EENMT:  Sclera clear, pupils equal, oral mucosa moist  Respiratory: coarse breath sounds/rhonchi  Cardiovascular:  RRR without M,G,R  Gastrointestinal: soft and non-tender; with positive bowel sounds. Musculoskeletal: warm without cyanosis. There is trace lower leg edema. Skin:  no jaundice or rashes  Neurologic: no gross neuro deficits     Psychiatric:  alert and oriented x 3    CHEST XRAY:       LAB  No results for input(s): GLUCPOC in the last 72 hours. No lab exists for component: GLPOC   No results for input(s): WBC, HGB, HCT, PLT, INR, HGBEXT, HCTEXT, PLTEXT in the last 72 hours. No lab exists for component: INREXT  Recent Labs      02/27/17   0641  02/26/17   0104  02/25/17 2000 02/25/17   1415   NA  141   --    --    --    K  3.3*   --    --    --    CL  98   --    --    --    CO2  37*   --    --    --    GLU  77   --    --    --    BUN  19   --    --    --    CREA  0.68   --    --    --    CA  8.8   --    --    --    TROIQ   --   0.13*  0.09*  0.11*     No results for input(s): PH, PCO2, PO2, HCO3 in the last 72 hours.   No results for input(s): LCAD, LAC in the last 72 hours.      Assessment:  (Medical Decision Making)     Hospital Problems  Date Reviewed: 2/27/2017          Codes Class Noted POA    UTI (urinary tract infection)-on zosyn for E coli  ICD-10-CM: N39.0  ICD-9-CM: 599.0  2/25/2017 Yes        Sepsis (HCC)-improved ICD-10-CM: A41.9  ICD-9-CM: 038.9, 995.91  2/24/2017 Yes        Acute respiratory failure with hypoxia (HCC)-wean O2, 93% on RA ICD-10-CM: J96.01  ICD-9-CM: 518.81  2/20/2017 Yes        * (Principal)Influenza-completed tamiflu, discontinue precautions ICD-10-CM: J11.1  ICD-9-CM: 487.1  2/20/2017 Yes        DNR (do not resuscitate)-chronic  ICD-10-CM: Z66  ICD-9-CM: V49.86  2/20/2017 Yes        HCAP (healthcare-associated pneumonia)-on Zosyn, day 7, Continue mucinex, add flutter valve ICD-10-CM: J18.9  ICD-9-CM: 082  2/20/2017 Yes        Elevated troponin-likely supply/demand mismatch. No chest pain per pt. ICD-10-CM: R79.89  ICD-9-CM: 790.6  2/20/2017 Yes              Plan:  (Medical Decision Making)     --take O2 off of pt as on RA with sats 93%  --continue nebs  --Zosyn day 7  --add flutter valve  --continue mucinex  --PT/OT evaluation  --engage social work to assist with discharge planning  --ST evaluation to evaluate for aspiration    More than 50% of the time documented was spent in face-to-face contact with the patient and in the care of the patient on the floor/unit where the patient is located. ALIVIA Sosa  Lungs: rhonchi  Heart:  RRR with no Murmur/Rubs/Gallops    Additional Comments:  Discharge planning, continue nebulizer rxs and add flutter, may need a couple of doses of steroids    I have spoken with and examined the patient. I agree with the above assessment and plan as documented.     Trudi Judge MD

## 2017-02-28 ENCOUNTER — APPOINTMENT (OUTPATIENT)
Dept: GENERAL RADIOLOGY | Age: 82
DRG: 871 | End: 2017-02-28
Attending: PHYSICIAN ASSISTANT
Payer: MEDICARE

## 2017-02-28 LAB
MM INDURATION POC: 0 MM (ref 0–5)
PPD POC: NORMAL NEGATIVE

## 2017-02-28 PROCEDURE — 74011000258 HC RX REV CODE- 258: Performed by: INTERNAL MEDICINE

## 2017-02-28 PROCEDURE — 92611 MOTION FLUOROSCOPY/SWALLOW: CPT

## 2017-02-28 PROCEDURE — 94760 N-INVAS EAR/PLS OXIMETRY 1: CPT

## 2017-02-28 PROCEDURE — 74011000250 HC RX REV CODE- 250: Performed by: INTERNAL MEDICINE

## 2017-02-28 PROCEDURE — 65270000029 HC RM PRIVATE

## 2017-02-28 PROCEDURE — 74230 X-RAY XM SWLNG FUNCJ C+: CPT

## 2017-02-28 PROCEDURE — 99232 SBSQ HOSP IP/OBS MODERATE 35: CPT | Performed by: INTERNAL MEDICINE

## 2017-02-28 PROCEDURE — 74011250637 HC RX REV CODE- 250/637: Performed by: INTERNAL MEDICINE

## 2017-02-28 PROCEDURE — 74011250636 HC RX REV CODE- 250/636: Performed by: INTERNAL MEDICINE

## 2017-02-28 PROCEDURE — 94640 AIRWAY INHALATION TREATMENT: CPT

## 2017-02-28 PROCEDURE — 74011000255 HC RX REV CODE- 255: Performed by: INTERNAL MEDICINE

## 2017-02-28 RX ADMIN — FOLIC ACID 1 MG: 1 TABLET ORAL at 08:28

## 2017-02-28 RX ADMIN — GUAIFENESIN 1200 MG: 600 TABLET, EXTENDED RELEASE ORAL at 22:22

## 2017-02-28 RX ADMIN — PIPERACILLIN SODIUM,TAZOBACTAM SODIUM 4.5 G: 4; .5 INJECTION, POWDER, FOR SOLUTION INTRAVENOUS at 05:32

## 2017-02-28 RX ADMIN — PREGABALIN 50 MG: 50 CAPSULE ORAL at 15:10

## 2017-02-28 RX ADMIN — BARIUM SULFATE 45 ML: 980 POWDER, FOR SUSPENSION ORAL at 10:14

## 2017-02-28 RX ADMIN — ALBUTEROL SULFATE 2.5 MG: 2.5 SOLUTION RESPIRATORY (INHALATION) at 19:16

## 2017-02-28 RX ADMIN — Medication 10 ML: at 22:00

## 2017-02-28 RX ADMIN — BARIUM SULFATE 15 ML: 400 PASTE ORAL at 10:12

## 2017-02-28 RX ADMIN — CLORAZEPATE DIPOTASSIUM 3.75 MG: 7.5 TABLET ORAL at 15:10

## 2017-02-28 RX ADMIN — FENOFIBRATE 54 MG: 54 TABLET ORAL at 08:28

## 2017-02-28 RX ADMIN — HYDROCHLOROTHIAZIDE 25 MG: 25 TABLET ORAL at 08:20

## 2017-02-28 RX ADMIN — BARIUM SULFATE 45 ML: 400 SUSPENSION ORAL at 10:13

## 2017-02-28 RX ADMIN — MEMANTINE HYDROCHLORIDE 10 MG: 5 TABLET ORAL at 17:50

## 2017-02-28 RX ADMIN — Medication 10 ML: at 05:32

## 2017-02-28 RX ADMIN — ALBUTEROL SULFATE 2.5 MG: 2.5 SOLUTION RESPIRATORY (INHALATION) at 13:25

## 2017-02-28 RX ADMIN — FERROUS SULFATE TAB 325 MG (65 MG ELEMENTAL FE) 325 MG: 325 (65 FE) TAB at 08:28

## 2017-02-28 RX ADMIN — CLORAZEPATE DIPOTASSIUM 3.75 MG: 7.5 TABLET ORAL at 08:20

## 2017-02-28 RX ADMIN — ALBUTEROL SULFATE 2.5 MG: 2.5 SOLUTION RESPIRATORY (INHALATION) at 07:01

## 2017-02-28 RX ADMIN — GUAIFENESIN 1200 MG: 600 TABLET, EXTENDED RELEASE ORAL at 08:28

## 2017-02-28 RX ADMIN — ESCITALOPRAM OXALATE 20 MG: 10 TABLET ORAL at 17:50

## 2017-02-28 RX ADMIN — ASPIRIN 81 MG 81 MG: 81 TABLET ORAL at 08:28

## 2017-02-28 RX ADMIN — CLORAZEPATE DIPOTASSIUM 3.75 MG: 7.5 TABLET ORAL at 22:22

## 2017-02-28 RX ADMIN — Medication 10 ML: at 15:09

## 2017-02-28 RX ADMIN — DULOXETINE HYDROCHLORIDE 30 MG: 30 CAPSULE, DELAYED RELEASE ORAL at 08:28

## 2017-02-28 RX ADMIN — SENNOSIDES 8.6 MG: 8.6 TABLET, FILM COATED ORAL at 22:23

## 2017-02-28 RX ADMIN — PREGABALIN 50 MG: 50 CAPSULE ORAL at 08:28

## 2017-02-28 RX ADMIN — ATENOLOL 100 MG: 50 TABLET ORAL at 08:28

## 2017-02-28 RX ADMIN — ENOXAPARIN SODIUM 40 MG: 40 INJECTION SUBCUTANEOUS at 08:37

## 2017-02-28 RX ADMIN — MEMANTINE HYDROCHLORIDE 10 MG: 5 TABLET ORAL at 08:28

## 2017-02-28 RX ADMIN — PREGABALIN 50 MG: 50 CAPSULE ORAL at 22:23

## 2017-02-28 NOTE — PROGRESS NOTES
Speech language pathology: modified barium swallow study: Initial Assessment    NAME/AGE/GENDER: Jay Webb is a 80 y.o. female  DATE: 2/28/2017  PRIMARY DIAGNOSIS: Influenza       ICD-10: Treatment Diagnosis: dysphagia; pharyngoesophageal R13.14  INTERDISCIPLINARY COLLABORATION: Registered Nurse  PRECAUTIONS/ALLERGIES: Neurontin [gabapentin] ASSESSMENT/PLAN OF CARE:Based on the objective data described below, Ms. Webb presents with transient laryngeal penetration with a tsp of thin liquids and thin liquids by cup. Trace laryngeal penetration to the vestibule that does not completely clear with thin liquids from the straw. Patient had decreased opening of the UES with min-mod residue in the pyriform sinuses which was reduced with cues for a double swallow. Patient had less pyriform residue with thicker consistencies but with min-mod residue in the valleculae. No aspiration with any consistency. Recommend regular textures/thin liquids. No straws. Fully upright with cues for small/bites sips and double swallow. Will follow for dysphagia tx. Patient will benefit from skilled intervention to address the below impairments. ?????? ? ? This section established at most recent assessment??????????  RECOMMENDATIONS AND PLANNED INTERVENTIONS (Benefits and precautions of therapy have been discussed with the patient.):  · PO:  Regular  · Liquids:  regular thin no straws  MEDICATIONS:  · Crushed in puree  COMPENSATORY STRATEGIES/MODIFICATIONS INCLUDING:  · Effortful swallow  · Small sips and bites  · No straws  · Double swallow  OTHER RECOMMENDATIONS (including follow up treatment recommendations): · Family training/education  · Laryngeal exercises  · Patient education  FREQUENCY/DURATION: Continue to follow patient 3x a week  to address above goals. RECOMMENDED REHABILITATION/EQUIPMENT: (at time of discharge pending progress):   Rehab. SUBJECTIVE:   Cooperative.   History of Present Injury/Illness: Ms. Webb  has a past medical history of Arthritis; Cancer ([facility] [address] Hypertension; Ill-defined condition; Ill-defined condition; Ill-defined condition; Neurological disorder; and Psychiatric disorder. . She also  has a past surgical history that includes gyn and heent. Present Symptoms: cough  Pain Intensity 1: 0  Current Dietary Status:    Radiologist: Dr. Irina Kirby Situation: [facility]: [facility] [address] [place]: [place] [facility] [address]  # Steps to Enter: 0  One/Two Story Residence: One story  Living Alone: No  Support Systems: Child(abimael), Family member(s), Assisted living  Patient Expects to be Discharged to[de-identified] Unknown  Current DME Used/Available at Home: Wheelchair, Walker, rolling  Tub or Shower Type: Shower  OBJECTIVE:       Cognitive/Communication Status:  Mental Status  Neurologic State: Alert  Orientation Level: Oriented to person  Cognition: Follows commands  Perception: Appears intact  Perseveration: Perseverates during conversation  Safety/Judgement: Fall prevention    Oral Assessment:  Oral Assessment  Labial: No impairment  Dentition: Limited  Oral Hygiene: fair  Lingual: No impairment    Vocal Quality: WFL    Patient Viewed: Patient Position: upright in chair  Film Views: Lateral, Fluoro    Oral Prepatory:  The patient was given the following: Consistency Presented:  Thin liquid, Nectar thick liquid, Mixed consistency, Solid, Pudding  How Presented: Self-fed/presented, SLP-fed/presented, Cup/sip, Spoon, Straw    Oral Phase:  Bolus Acceptance: No impairment  Bolus Formation/Control: Impaired  Propulsion: Delayed (# of seconds)  Type of Impairment: Delayed, Premature spillage  Oral Residue: None  Initiation of Swallow: Triggered at pyriform sinus(es), Triggered at vallecula  Oral Phase Severity: Mild    Pharyngeal Phase:  Timing: Pooling 1-5 sec  Decreased Tongue Base Retraction?: Yes  Laryngeal Elevation: Incomplete laryngeal closure, Inadequate epiglottic inversion  Penetration: Flash/transient, During swallow, To laryngeal vestibule, Trace, From initial swallow  Aspiration/Timing: No evidence of aspiration  Aspiration/Penetration Score: 3 (Penetration/Visible residue-Contrast remains above the folds/cords, but is not cleared)  Pharyngeal Symmetry: Not assessed  Pharyngeal Dysfunction: Crico-pharyngeal dysfunction, Decreased elevation/closure, Decreased tongue base retraction, Decreased strength  Pharyngeal Phase Severity: Mild  Pharyngeal-Esophageal Segment: Decreased relaxation of upper esophageal segment    Assessment/Reassessment only, no treatment provided today    Tool Used: Dysphagia Outcome and Severity Scale (MILENA)    Score Comments   Normal Diet  [] 7 With no strategies or extra time needed       Functional Swallow  [] 6 May have mild oral or pharyngeal delay       Mild Dysphagia    [x] 5 Which may require one diet consistency restricted (those who demonstrate penetration which is entirely cleared on MBS would be included)   Mild-Moderate Dysphagia  [] 4 With 1-2 diet consistencies restricted       Moderate Dysphagia  [] 3 With 2 or more diet consistencies restricted       Moderately Severe Dysphagia  [] 2 With partial PO strategies (trials with ST only)       Severe Dysphagia  [] 1 With inability to tolerate any PO safely          Score:  Initial: 5 Most Recent: X (Date: -- )   Interpretation of Tool: The Dysphagia Outcome and Severity Scale (MILENA) is a simple, easy-to-use, 7-point scale developed to systematically rate the functional severity of dysphagia based on objective assessment and make recommendations for diet level, independence level, and type of nutrition. Score 7 6 5 4 3 2 1   Modifier CH CI CJ CK CL CM CN   ?  Swallowing:     - CURRENT STATUS: CJ - 20%-39% impaired, limited or restricted    - GOAL STATUS:  CI - 1%-19% impaired, limited or restricted    - D/C STATUS:  ---------------To be determined---------------  [place] / Product Type: Medicare /   __________________________________________________________________________________________________  Safety:   After treatment position/precautions:  · RN notified  · Upright in Bed  Recommendations for treatment: laryngeal exercises  Total Treatment Duration:  Time In: 0945   Time Out: [address] [place], SLP

## 2017-02-28 NOTE — PROGRESS NOTES
OT note:  Pt declined treatment despite max encouragement from therapist.   Randy Montesinos, [address]

## 2017-02-28 NOTE — PROGRESS NOTES
Problem: Nutrition Deficit  Goal: *Optimize nutritional status  Nutrition LOS Note: day 8  Assessment  Diet order(s): regular  Food,Nutrition, and Pertinent History: Patient with HCAP, flu +, and dementia from nursing home. RD observed breakfast tray untouched. She states that she typically has one banana and a cup of coffee for breakfast.  Upon RD questioning whether patient likes ensure or boost, she states that she doesn't drink that because she \"is a big lady. \"  Anthropometrics: Height: 5' 6\" (167.6 cm), unstated  , Weight: 77.1 kg (170 lb), Body mass index is 27.44 kg/(m^2). BMI class of overweight. WT / BMI 2/20/2017 1/13/2017 1/7/2017 8/9/2016 3/17/2015   WEIGHT 170 lb 145 lb 168 lb 168 lb 135 lb   Patient's weight has trended up over the past ~2 years. Macronutrient Needs:  · EER:  9995-2826 kcal /day (20-25 kcal/kg listed BW)  · EPR:  59-71 grams protein/day (1-1.2 grams/kg IBW)  Intake/Comparative Standards: Per RD meal rounds: 0% of breakfast. Average intake for past 8 day(s)/3 recorded meal(s): 33%. This potentially meets ~49% of kcal and ~54% of protein needs     Nutrition Diagnosis: Predicted sub-optimal intake r/t decreased ability to consume adequate oral intake, as evidenced by patient with intermittent confusion, dyspnea, meeting above noted needs. Intervention: Meals and snacks: Continue current diet per SLP  Patient unreceptive to supplementation at this time.       Shaggy [address] [place] [address], [phone]

## 2017-02-28 NOTE — PROGRESS NOTES
[facility]  Admission Date: 2/20/2017             Daily Progress Note: 2/28/2017    The patient's chart is reviewed and the patient is discussed with the staff. Pt is a 79 yo [place] female with a hx of dementia who was seen by bMD 360 on 2/19 and diagnosed with flu . She returned to her [facility] and on 2/20 was found down at [facility]. EMS summoned and CPR initiated. On arrival by EMS pt awake and converstant. She was started on O2 and brought to ER. She was found to have PNA on CXR. She also has E coli UTI and is on Zosyn. Subjective:     Pt on RA. Pt asleep but awakes easily. She denies complaints.      Current Facility-Administered Medications   Medication Dose Route Frequency    barium Sulfate (E-Z-HD) 98 % contrast susp 135 mL  135 mL Oral RAD ONCE    barium sulfate (VARIBAR NECTAR) 40 % (w/v) contrast suspension 15 mL  15 mL Oral RAD ONCE    barium sulfate (VARIBAR PUDDING) 40 % (w/v), 30% (w/w) contrast oral paste 15 mL  15 mL Oral RAD ONCE    barium sulfate (VARIBAR THIN HONEY) 40 % (w/v) 29% (w/w) contrast suspension 15 mL  15 mL Oral RAD ONCE    albuterol (PROVENTIL VENTOLIN) nebulizer solution 2.5 mg  2.5 mg Nebulization TID RT    albuterol (PROVENTIL VENTOLIN) nebulizer solution 2.5 mg  2.5 mg Nebulization Q6H PRN    guaiFENesin ER (MUCINEX) tablet 1,200 mg  1,200 mg Oral Q12H    NUTRITIONAL SUPPORT ELECTROLYTE PRN ORDERS   Does Not Apply PRN    enoxaparin (LOVENOX) injection 40 mg  40 mg SubCUTAneous Q24H    piperacillin-tazobactam (ZOSYN) 4.5 g in 0.9% sodium chloride (MBP/ADV) 100 mL  4.5 g IntraVENous Q8H    acetaminophen (TYLENOL) tablet 650 mg  650 mg Oral Q6H PRN    aspirin chewable tablet 81 mg  81 mg Oral DAILY    atenolol (TENORMIN) tablet 100 mg  100 mg Oral DAILY    clorazepate (TRANXENE) tablet 3.75 mg  3.75 mg Oral TID    DULoxetine (CYMBALTA) capsule 30 mg  30 mg Oral DAILY    escitalopram oxalate (LEXAPRO) tablet 20 mg  20 mg Oral DAILY WITH DINNER    fenofibrate (LOFIBRA) tablet 54 mg  54 mg Oral DAILY    ferrous sulfate tablet 325 mg  1 Tab Oral DAILY WITH BREAKFAST    folic acid (FOLVITE) tablet 1 mg  1 mg Oral DAILY    hydroCHLOROthiazide (HYDRODIURIL) tablet 25 mg  25 mg Oral DAILY    memantine (NAMENDA) tablet 10 mg  10 mg Oral BID    pregabalin (LYRICA) capsule 50 mg  50 mg Oral TID    senna (SENOKOT) tablet 8.6 mg  1 Tab Oral QHS    sodium chloride (NS) flush 5-10 mL  5-10 mL IntraVENous Q8H    sodium chloride (NS) flush 5-10 mL  5-10 mL IntraVENous PRN       Review of Systems  Constitutional: negative for fever, chills, sweats  Cardiovascular: negative for chest pain, palpitations, syncope, edema  Gastrointestinal:  negative for dysphagia, reflux, vomiting, diarrhea, abdominal pain, or melena  Neurologic:  negative for focal weakness, numbness, headache    Objective:     Vitals:    02/27/17 2044 02/28/17 0008 02/28/17 0502 02/28/17 0702   BP:  151/78 152/89    Pulse:  68 65    Resp:  20 20    Temp:  98.9 °F (37.2 °C) 98.5 °F (36.9 °C)    SpO2: 95% 92% 96% 97%   Weight:       Height:         Intake and Output:   02/26 1901 - 02/28 0700  In: 240 [P.O.:240]  Out: -        Physical Exam:   Constitution:  the patient is well developed and in no acute distress, on RA  EENMT:  Sclera clear, pupils equal, oral mucosa moist  Respiratory: few rhonchi  Cardiovascular:  RRR, +JONO  Gastrointestinal: soft and non-tender; with positive bowel sounds. Musculoskeletal: warm without cyanosis. There is no lower leg edema. Skin:  no jaundice or rashes  Neurologic: no gross neuro deficits     Psychiatric:  alert and oriented x 2      LAB  No results for input(s): GLUCPOC in the last 72 hours. No lab exists for component: GLPOC   No results for input(s): WBC, HGB, HCT, PLT, INR, HGBEXT, HCTEXT, PLTEXT in the last 72 hours.     No lab exists for component: INREXT  Recent Labs      02/27/17   0641  02/26/17   0104  02/25/17   2000  02/25/17   1415   NA  141   -- --    --    K  3.3*   --    --    --    CL  98   --    --    --    CO2  37*   --    --    --    GLU  77   --    --    --    BUN  19   --    --    --    CREA  0.68   --    --    --    CA  8.8   --    --    --    TROIQ   --   0.13*  0.09*  0.11*     No results for input(s): PH, PCO2, PO2, HCO3 in the last 72 hours. No results for input(s): LCAD, LAC in the last 72 hours. Assessment:  (Medical Decision Making)     Hospital Problems  Date Reviewed: 2/28/2017          Codes Class Noted POA    UTI (urinary tract infection)-on zosyn-completed 8 days, will discontinue today ICD-10-CM: N39.0  ICD-9-CM: 599.0  2/25/2017 Yes        Sepsis (HCC)-resovled ICD-10-CM: A41.9  ICD-9-CM: 038.9, 995.91  2/24/2017 Yes        Acute respiratory failure with hypoxia (HCC)-wean O2 as tolerated ICD-10-CM: J96.01  ICD-9-CM: 518.81  2/20/2017 Yes        * (Principal)Influenza-completed tamiflu ICD-10-CM: J11.1  ICD-9-CM: 487.1  2/20/2017 Yes        DNR (do not resuscitate)-chronic  ICD-10-CM: Z66  ICD-9-CM: V49.86  2/20/2017 Yes        HCAP (healthcare-associated pneumonia)-completed abx ICD-10-CM: J18.9  ICD-9-CM: 149  2/20/2017 Yes        Elevated troponin-likely supply/demand mismatch ICD-10-CM: R79.89  ICD-9-CM: 790.6  2/20/2017 Yes              Plan:  (Medical Decision Making)     --on RA  --on zosyn-completed 8 days of abx for E coli UTI  --for MBS today  --continue nebs/flutter valve  --consider steroids x few doses  --discharge planning to STR in next few days    More than 50% of the time documented was spent in face-to-face contact with the patient and in the care of the patient on the floor/unit where the patient is located. ALIVIA Pineda          Lungs:  Clear   Heart:  RRR with no Murmur/Rubs/Gallops    Additional Comments:  awating STR discharge   I have spoken with and examined the patient. I agree with the above assessment and plan as documented.     Ryanne Brownlee MD

## 2017-02-28 NOTE — PROGRESS NOTES
Pt has been accepted for admission to [facility] and can transfer there on Wednesday if she is medically cleared for discharge. SW left voice mail message with dtr to notify her of the bed offer and plan for discharge. SW following to facilitate pt's transfer to rehab at discharge.

## 2017-03-01 VITALS
DIASTOLIC BLOOD PRESSURE: 72 MMHG | HEIGHT: 66 IN | TEMPERATURE: 98.2 F | OXYGEN SATURATION: 86 % | HEART RATE: 61 BPM | RESPIRATION RATE: 20 BRPM | WEIGHT: 170 LBS | SYSTOLIC BLOOD PRESSURE: 120 MMHG | BODY MASS INDEX: 27.32 KG/M2

## 2017-03-01 LAB
MM INDURATION POC: 0 MM (ref 0–5)
PPD POC: NORMAL NEGATIVE

## 2017-03-01 PROCEDURE — 97110 THERAPEUTIC EXERCISES: CPT

## 2017-03-01 PROCEDURE — 74011250636 HC RX REV CODE- 250/636: Performed by: INTERNAL MEDICINE

## 2017-03-01 PROCEDURE — 74011250637 HC RX REV CODE- 250/637: Performed by: INTERNAL MEDICINE

## 2017-03-01 PROCEDURE — 97530 THERAPEUTIC ACTIVITIES: CPT

## 2017-03-01 PROCEDURE — 92526 ORAL FUNCTION THERAPY: CPT

## 2017-03-01 PROCEDURE — 94760 N-INVAS EAR/PLS OXIMETRY 1: CPT

## 2017-03-01 PROCEDURE — 74011000250 HC RX REV CODE- 250: Performed by: INTERNAL MEDICINE

## 2017-03-01 PROCEDURE — 99239 HOSP IP/OBS DSCHRG MGMT >30: CPT | Performed by: INTERNAL MEDICINE

## 2017-03-01 PROCEDURE — 94640 AIRWAY INHALATION TREATMENT: CPT

## 2017-03-01 PROCEDURE — 77010033678 HC OXYGEN DAILY

## 2017-03-01 RX ORDER — PREGABALIN 50 MG/1
50 CAPSULE ORAL 3 TIMES DAILY
Qty: 90 CAP | Refills: 0 | Status: SHIPPED | OUTPATIENT
Start: 2017-03-01 | End: 2017-03-31

## 2017-03-01 RX ADMIN — ENOXAPARIN SODIUM 40 MG: 40 INJECTION SUBCUTANEOUS at 09:30

## 2017-03-01 RX ADMIN — DULOXETINE HYDROCHLORIDE 30 MG: 30 CAPSULE, DELAYED RELEASE ORAL at 09:29

## 2017-03-01 RX ADMIN — HYDROCHLOROTHIAZIDE 25 MG: 25 TABLET ORAL at 09:29

## 2017-03-01 RX ADMIN — PREGABALIN 50 MG: 50 CAPSULE ORAL at 09:29

## 2017-03-01 RX ADMIN — CLORAZEPATE DIPOTASSIUM 3.75 MG: 7.5 TABLET ORAL at 09:29

## 2017-03-01 RX ADMIN — MEMANTINE HYDROCHLORIDE 10 MG: 5 TABLET ORAL at 09:29

## 2017-03-01 RX ADMIN — Medication 10 ML: at 06:00

## 2017-03-01 RX ADMIN — ALBUTEROL SULFATE 2.5 MG: 2.5 SOLUTION RESPIRATORY (INHALATION) at 09:20

## 2017-03-01 RX ADMIN — FOLIC ACID 1 MG: 1 TABLET ORAL at 09:30

## 2017-03-01 RX ADMIN — ASPIRIN 81 MG 81 MG: 81 TABLET ORAL at 09:29

## 2017-03-01 RX ADMIN — ATENOLOL 100 MG: 50 TABLET ORAL at 09:29

## 2017-03-01 RX ADMIN — FENOFIBRATE 54 MG: 54 TABLET ORAL at 09:29

## 2017-03-01 RX ADMIN — ALBUTEROL SULFATE 2.5 MG: 2.5 SOLUTION RESPIRATORY (INHALATION) at 15:07

## 2017-03-01 RX ADMIN — GUAIFENESIN 1200 MG: 600 TABLET, EXTENDED RELEASE ORAL at 09:30

## 2017-03-01 RX ADMIN — FERROUS SULFATE TAB 325 MG (65 MG ELEMENTAL FE) 325 MG: 325 (65 FE) TAB at 09:29

## 2017-03-01 NOTE — PROGRESS NOTES
Pt medically cleared for discharge today and will transfer to [facility] for STR services. Orders faxed to facility prior to discharge and originals will be forwarded to facility by [facility] transport staff. SW spoke to pt's dtr at 1500 and notified her of pt's discharge and transport time.

## 2017-03-01 NOTE — PROGRESS NOTES
Problem: Dysphagia (Adult)  Goal: *Speech Goal: (INSERT TEXT)  STG: Pt will participate with modified barium swallow study x1 (goal met 2/28/17)  STG: Pt will participate with laryngeal exercises x10 with 80% accuracy  STG: Pt will effectively use compensatory strategies during treatment sessions with minimal cues  LTG: Pt will tolerate the least restrictive diet at discharge without respiratory compromise  SPEECH LANGUAGE PATHOLOGY: BEDSIDE SWALLOW NOTE: Daily Note 1     NAME/AGE/GENDER: Shira Webb is a 80 y.o. female  DATE: 3/1/2017  PRIMARY DIAGNOSIS: Influenza       ICD-10: Treatment Diagnosis: dysphagia; unspecified R13.10  INTERDISCIPLINARY COLLABORATION: Registered Nurse  PRECAUTIONS/ALLERGIES: Neurontin [gabapentin]   ASSESSMENT:   Patient with baseline hacking cough x1 prior to po trials and similar delayed cough x1 during treatment. She did not eat her breakfast and was not interested in solid trials but agreeable to drink water. Straws removed from the drinks in her room. Patient instructed on small sips with effortful swallow and required cues to perform a dry swallow after each presentation. Patient attempted laryngeal exercises listed below x5 with moderate cues. Perseverating on when she can go back to sleep with encouragement to participate required. Recommend continue regular textures/thin liquids by single cup sip with cues for double swallow. No straws. Patient would benefit from assistance with meals to encourage increased po intake and ensure use of compensatory strategies to reduce aspiration risk. Precautions hung at bedside. Patient will benefit from skilled intervention to address the below impairments. ?????? ? ? This section established at most recent assessment??????????  PROBLEM LIST (Impairments causing functional limitations):  1.   Dysphagia  REHABILITATION POTENTIAL FOR STATED GOALS: GOOD      PLAN OF CARE:   Patient will benefit from skilled intervention to address the following impairments. RECOMMENDATIONS AND PLANNED INTERVENTIONS (Benefits and precautions of therapy have been discussed with the patient.):  · continue prescribed diet  MEDICATIONS:  · in puree  COMPENSATORY STRATEGIES/MODIFICATIONS INCLUDING:  · Small sips and bites  · Fully upright  OTHER RECOMMENDATIONS (including follow up treatment recommendations): · Family training/education  · Patient education  RECOMMENDED DIET MODIFICATIONS DISCUSSED WITH:  · Nursing  · Patient  FREQUENCY/DURATION: Continue to follow patient 3x a week or until short term goals are met to address above goals. RECOMMENDED REHABILITATION/EQUIPMENT: (at time of discharge pending progress):   to be determined. SUBJECTIVE:   Required re-education on compensatory strategies. History of Present Injury/Illness: Ms. Webb  has a past medical history of Arthritis; Cancer ([facility] [address] Hypertension; Ill-defined condition; Ill-defined condition; Ill-defined condition; Neurological disorder; and Psychiatric disorder. . She also  has a past surgical history that includes gyn and heent. Present Symptoms: cough  Pain Intensity 1: 0  Current Medications:   No current facility-administered medications on file prior to encounter. Current Outpatient Prescriptions on File Prior to Encounter   Medication Sig Dispense Refill    HYDROcodone-acetaminophen (NORCO) 5-325 mg per tablet Take 1 Tab by mouth every six (6) hours as needed for Pain. Max Daily Amount: 4 Tabs. 6 Tab 0    cranberry 500 mg capsule Take 1,000 mg by mouth daily.  DULoxetine (CYMBALTA) 30 mg capsule Take 30 mg by mouth daily.  escitalopram oxalate (LEXAPRO) 20 mg tablet Take 20 mg by mouth daily (with dinner).  fenofibrate nanocrystallized (TRICOR) 48 mg tablet Take 48 mg by mouth daily.  ferrous sulfate 325 mg (65 mg iron) tablet Take  by mouth Daily (before breakfast).  folic acid (FOLVITE) 1 mg tablet Take 1 mg by mouth daily.       hydrochlorothiazide (HYDRODIURIL) 25 mg tablet Take 25 mg by mouth daily.  Iron Fum & PS Cmp-Vit C-Niacin 125-40-3 mg cap Take 1 Cap by mouth daily.  pregabalin (LYRICA) 50 mg capsule Take 50 mg by mouth three (3) times daily.  memantine (NAMENDA) 10 mg tablet Take 10 mg by mouth two (2) times a day.  cholecalciferol, vitamin d3, 400 unit cap Take 1 Tab by mouth two (2) times a day.  clorazepate (TRANXENE) 3.75 mg tablet Take 3.75 mg by mouth three (3) times daily.  acetaminophen 500 mg cap Take 1 Tab by mouth every four (4) hours as needed.  sennosides 8.6 mg cap Take 1 Tab by mouth nightly as needed.  aspirin 81 mg chewable tablet Take 81 mg by mouth daily.  atenolol (TENORMIN) 100 mg tablet Take 100 mg by mouth daily.  loperamide (IMMODIUM) 2 mg tablet Take 2 mg by mouth four (4) times daily as needed for Diarrhea. Current Dietary Status:  Regular textures/ no straws        Radiologist: Dr. Yolie Hart  History of reflux:  NO   Social History/Home Situation: [facility]  Home Environment: [address] [place]: [place]  # Steps to Enter: 0  One/Two Story Residence: One story  Living Alone: No  Support Systems: Child(abimael), Family member(s), Assisted living  Patient Expects to be Discharged to[de-identified] Unknown  Current DME Used/Available at Home: Wheelchair, Walker, rolling  Tub or Shower Type: Shower      OBJECTIVE:   Respiratory Status:  Nasal cannula  2 l/min  CXR Results:New retrocardiac opacities which appear to represent airspace changes and a  small effusion. Symptoms of pneumonia should be excluded given the unilateral  distribution. Less likely, this may represent an atypical presentation of heart  failure given the associated cardiomegaly.  Recommend clinical correlation  MRI/CT Results: n/a  Oral Motor Structure/Speech:  Oral-Motor Structure/Motor Speech  Labial: No impairment  Dentition: Limited, Natural  Oral Hygiene: fair  Lingual: No impairment     Cognitive and Communication Status:  Neurologic State: Alert  Orientation Level: Oriented to person;Oriented to place  Cognition: Follows commands;Memory loss  Perception: Appears intact  Perseveration: Perseverates during conversation  Safety/Judgement: Fall prevention     BEDSIDE SWALLOW EVALUATION  Oral Assessment:  Oral Assessment  Labial: No impairment  Dentition: Limited;Natural  Lingual: No impairment  P.O. Trials: The patient was given tsp to straw amounts of the following:   Consistency Presented:  Thin liquid  How Presented: Cup/sip     ORAL PHASE:  Bolus Acceptance: No impairment  Bolus Formation/Control: No impairment  Propulsion: No impairment     Oral Residue: None     PHARYNGEAL PHASE:  Initiation of Swallow: Delayed (# of seconds)     Aspiration Signs/Symptoms: Delayed cough/throat clear  Vocal Quality: No impairment           Pharyngeal Phase Characteristics: Suspected pharyngeal residue     LARYNGEAL / PHARYNGEAL EXERCISES:           Effortful Swallow: Yes  Reps : 5  Sets : 1  Hard Glottal Attack: Yes  Reps : 5  Sets : 1                       Deanne:  (unable to complete)                                  Sing \"EEE\": Yes  Reps : 5  Sets : 1                    Sustained \"ah\": Yes  Sets : 1  Reps : 5                   OTHER OBSERVATIONS:  Rate/bite size: Impaired   Endurance:  Impaired          Tool Used: Dysphagia Outcome and Severity Scale (MILENA)     Score Comments   Normal Diet  [ ] 7 With no strategies or extra time needed   Functional Swallow  [ ] 6 May have mild oral or pharyngeal delay         Mild Dysphagia     [X] 5 Which may require one diet consistency restricted (those who demonstrate penetration which is entirely cleared on MBS would be included)   Mild-Moderate Dysphagia  [ ] 4 With 1-2 diet consistencies restricted         Moderate Dysphagia  [ ] 3 With 2 or more diet consistencies restricted         Moderately Severe Dysphagia  [ ] 2 With partial PO strategies (trials with ST only)         Severe Dysphagia  [ ] 1 With inability to tolerate any PO safely            Score:  Initial: 5 Most Recent: X (Date: -- )   Interpretation of Tool: The Dysphagia Outcome and Severity Scale (MILENA) is a simple, easy-to-use, 7-point scale developed to systematically rate the functional severity of dysphagia based on objective assessment and make recommendations for diet level, independence level, and type of nutrition. Score 7 6 5 4 3 2 1   Modifier CH CI CJ CK CL CM CN   · Swallowing:               - CURRENT STATUS:           CJ - 20%-39% impaired, limited or restricted               - GOAL STATUS:                   CI - 1%-19% impaired, limited or restricted               - D/C STATUS:                       ---------------To be determined---------------  [place] / [place] / Product Type: Medicare /       TREATMENT:         (In addition to Assessment/Re-Assessment sessions the following treatments were rendered)  Assessment/Reassessment only, no treatment provided today     LARYNGEAL / PHARYNGEAL EXERCISES:                                                                                                                                      __________________________________________________________________________________________________  Safety:   After treatment position/precautions:  · RN notified  · Upright in Bed  Progression/Medical Necessity:   · Skilled intervention continues to be required due to persistent signs and symptoms of aspiration. Compliance with Program/Exercises: Will assess as treatment progresses. Reason for Continuation of Services/Other Comments:  · Patient continues to require skilled intervention due to patient unable to attend/participate in therapy as expected. Recommendations/Intent for next treatment session: \"Treatment next visit will focus on laryngeal exercises; compensatory strategies\".     Total Treatment Duration:  Time In: 1016  Time Out: 1400 [address] [place], SLP

## 2017-03-01 NOTE — DISCHARGE SUMMARY
[facility]: Discharge Summary    [facility]    Admission date:2/20/2017    Discharge date: 3/1/2017    Admitting Gee Stanton    Discharge Diagnoses:    Hospital Problems  Date Reviewed: 2/28/2017          Codes Class Noted POA    UTI (urinary tract infection) ICD-10-CM: N39.0  ICD-9-CM: 599.0  2/25/2017 Yes        Sepsis ([facility] [address] ICD-10-CM: A41.9  ICD-9-CM: 038.9, 995.91  2/24/2017 Yes        Acute respiratory failure with hypoxia ([facility] [address] ICD-10-CM: J96.01  ICD-9-CM: 518.81  2/20/2017 Yes        * (Principal)Influenza ICD-10-CM: J11.1  ICD-9-CM: 487.1  2/20/2017 Yes        DNR (do not resuscitate) ICD-10-CM: Z66  ICD-9-CM: V49.86  2/20/2017 Yes        HCAP (healthcare-associated pneumonia) ICD-10-CM: J18.9  ICD-9-CM: 558  2/20/2017 Yes        Elevated troponin ICD-10-CM: R79.89  ICD-9-CM: 790.6  2/20/2017 Yes              Consultants:  Speech    Studies/Procedures:  * No surgery found *  No results found for this or any previous visit (from the past 24 hour(s)). MBS  IMPRESSION: Laryngeal penetration without tracheal aspiration. Hospital course:    [address]. presented after being found down at her [facility]. Her daughter provided all history. She was diagnosed with flu, started on Tamiflu PTA and was found down. CPR was started, EMS called, but on EMS arrival she was awake and conversant. Was started on O2 and brought to the ER. CXR consistent with pneumonia, she was hypoxic and placed on Opti-flow. Abx initiated. Troponin was elevated around 1. She was admitted and DNR status confirmed. Improved over the course of several days and weaned to RA. She has completed abx while here.  Since admission, she was seen by PT and will plan to be discharged to rehab.           Discharge Exam:  Visit Vitals    /72 (BP 1 Location: Right arm, BP Patient Position: Sitting)    Pulse 61    Temp 98.2 °F (36.8 °C)    Resp 20    Ht 5' 6\" (1.676 m)    Wt 170 lb (77.1 kg)    SpO2 96%    BMI 27.44 kg/m2 Discharge Medications:   Current Discharge Medication List      CONTINUE these medications which have CHANGED    Details   ! ! pregabalin (LYRICA) 50 mg capsule Take 1 Cap by mouth three (3) times daily for 30 days. Max Daily Amount: 150 mg.  Qty: 90 Cap, Refills: 0       !! - Potential duplicate medications found. Please discuss with provider. CONTINUE these medications which have NOT CHANGED    Details   Iron Fum & P-FA-Vit B & C No.9 (INTEGRA PLUS) 125-1 mg cap Take  by mouth.      cranberry 500 mg capsule Take 1,000 mg by mouth daily. DULoxetine (CYMBALTA) 30 mg capsule Take 30 mg by mouth daily. escitalopram oxalate (LEXAPRO) 20 mg tablet Take 20 mg by mouth daily (with dinner). fenofibrate nanocrystallized (TRICOR) 48 mg tablet Take 48 mg by mouth daily. ferrous sulfate 325 mg (65 mg iron) tablet Take  by mouth Daily (before breakfast). folic acid (FOLVITE) 1 mg tablet Take 1 mg by mouth daily. hydrochlorothiazide (HYDRODIURIL) 25 mg tablet Take 25 mg by mouth daily. Iron Fum & PS Cmp-Vit C-Niacin 125-40-3 mg cap Take 1 Cap by mouth daily. !! pregabalin (LYRICA) 50 mg capsule Take 50 mg by mouth three (3) times daily. memantine (NAMENDA) 10 mg tablet Take 10 mg by mouth two (2) times a day. cholecalciferol, vitamin d3, 400 unit cap Take 1 Tab by mouth two (2) times a day. clorazepate (TRANXENE) 3.75 mg tablet Take 3.75 mg by mouth three (3) times daily. acetaminophen 500 mg cap Take 1 Tab by mouth every four (4) hours as needed. sennosides 8.6 mg cap Take 1 Tab by mouth nightly as needed. aspirin 81 mg chewable tablet Take 81 mg by mouth daily. atenolol (TENORMIN) 100 mg tablet Take 100 mg by mouth daily. loperamide (IMMODIUM) 2 mg tablet Take 2 mg by mouth four (4) times daily as needed for Diarrhea. !! - Potential duplicate medications found. Please discuss with provider.       STOP taking these medications oseltamivir (TAMIFLU) 75 mg capsule Comments:   Reason for Stopping:         amoxicillin (AMOXIL) 500 mg capsule Comments:   Reason for Stopping:         HYDROcodone-acetaminophen (NORCO) 5-325 mg per tablet Comments:   Reason for Stopping:               Activity: needs assistance    Diet Restrictions:cardiac    Disposition: stable    Followup/Outpt Studies        Rehab  Now a DNR  Primary care physician in 3-4 weeks    Shelbi Zuñiga, CODI        Lungs:  CTA B, no w/r/r. Heart:  RRR with no Murmur/Rubs/Gallops    Additional Comments:    Patient improved and ready for d/c. To go to [facility]. I have spoken with and examined the patient. I agree with the above assessment and plan as documented.     Matt Ding MD

## 2017-03-01 NOTE — PROGRESS NOTES
Problem: Mobility Impaired (Adult and Pediatric)  Goal: *Acute Goals and Plan of Care (Insert Text)  STG:  (1.)Ms. Webb will move from supine to sit and sit to supine , scoot up and down and roll side to side with CONTACT GUARD ASSIST within 5 day(s). (2.)Ms. Webb will transfer from bed to chair and chair to bed with MINIMAL ASSIST using the least restrictive device within 5 day(s). (3.)Ms. Webb will ambulate with MINIMAL ASSIST for 5 feet with the least restrictive device within 5 day(s). (4.)Ms. Webb will perform LE exercises with 3 to 5 cues for form within 3 days to improve strength for functional transfers and ambulation. LTG:  (1.)Ms. Webb will move from supine to sit and sit to supine , scoot up and down and roll side to side in bed with STAND BY ASSIST within 10 day(s). (2.)Ms. Webb will transfer from bed to chair and chair to bed with CONTACT GUARD ASSIST using the least restrictive device within 10 day(s). (3.)Ms. Webb will ambulate with CONTACT GUARD ASSIST for 10 feet with the least restrictive device within 10 day(s). ________________________________________________________________________________________________      PHYSICAL THERAPY: Daily Note, Treatment Day: 2nd and AM 3/1/2017  INPATIENT: Hospital Day: 10  [place] / Product Type: Medicare /      NAME/AGE/GENDER: Chepe Murcia is a 80 y.o. female   PRIMARY DIAGNOSIS: Influenza Influenza Influenza        ICD-10: Treatment Diagnosis:       · Difficulty in walking, Not elsewhere classified (R26.2)   Precaution/Allergies:  Neurontin [gabapentin]       ASSESSMENT:      Ms. Webb is a 80 y.o. female with influenza. She lives in assisted living at [address] [place] [address]. Pt is supine in bed on arrival.  She is agreeable to PT. Pt sat EOB with min assist.  Pt stood x2 with min assist.  Pt was able to take a few steps toward [facility] with mod assist. She was unable to stand erect.  Left knee bent and trunk flexed during ambulation. She sat on EOB and performed bilateral LE ex. Pt returned supine with needs in reach. This section established at most recent assessment   PROBLEM LIST (Impairments causing functional limitations):  1. Decreased Strength  2. Decreased ADL/Functional Activities  3. Decreased Transfer Abilities  4. Decreased Ambulation Ability/Technique  5. Decreased Balance  6. Decreased Activity Tolerance  7. Decreased Pacing Skills  8. Decreased Knowledge of Precautions  9. Decreased [place] with Home Exercise Program  10. Decreased Cognition    INTERVENTIONS PLANNED: (Benefits and precautions of physical therapy have been discussed with the patient.)  1. Balance Exercise  2. Bed Mobility  3. Family Education  4. Gait Training  5. Home Exercise Program (HEP)  6. Therapeutic Activites  7. Therapeutic Exercise/Strengthening  8. Transfer Training  9. patient education  10. Group Therapy      TREATMENT PLAN: Frequency/Duration: 3 times a week for duration of hospital stay  Rehabilitation Potential For Stated Goals: FAIR      RECOMMENDED REHABILITATION/EQUIPMENT: (at time of discharge pending progress): Continue Skilled Therapy. HISTORY:   History of Present Injury/Illness (Reason for Referral):  Per MD Note: \"Patient is a 80 y.o.  female presents after being found down at her DESIRE. Her daughter provides all history. She reportedly began feeling bad over the past few days with increased cough, sore throat, and fever. She did not report any dyspnea. She went to an  yesterday and was diagnosed with flu, started on tamiflu last night. Staff reportedly found her down about 6pm this evening. CPR was apparently started and EMS called, but on EMS arrival the patient was awake and conversant. She was started on O2 and brought to the ER. Her CXR was consistent with pna, she was hypoxic and is currently on optiflow. Troponin was elevated around 1.  We have been asked to admit her. The patient remains confused above her baseline. The daughter states that she is DNR.\"  Past Medical History/Comorbidities:   Ms. Webb  has a past medical history of Arthritis; Cancer ([address] Hypertension; Ill-defined condition; Ill-defined condition; Ill-defined condition; Neurological disorder; and Psychiatric disorder. Ms. Webb  has a past surgical history that includes gyn and heent. Social History/Living Environment:   [facility] [address] Name: [place]  # Steps to Enter: 0  One/Two Story Residence: One story  Living Alone: No  Support Systems: Child(abimael), Family member(s), Assisted living  Patient Expects to be Discharged to[de-identified] Unknown  Current DME Used/Available at Home: Wheelchair, Walker, rolling  Tub or Shower Type: Shower  Prior Level of Function/Work/Activity:  Patient confused, no family to report prior level of function. She reports using rolling walker for short ambulation and wheelchair. Number of Personal Factors/Comorbidities that affect the Plan of Care: 3+: HIGH COMPLEXITY   EXAMINATION:   Most Recent Physical Functioning:   Gross Assessment:                  Posture:  Posture (WDL): Exceptions to WDL  Posture Assessment: Forward head, Rounded shoulders, Trunk flexion  Balance:  Sitting: Impaired  Sitting - Static: Good (unsupported)  Sitting - Dynamic: Fair (occasional)  Standing: Impaired  Standing - Static: Fair  Standing - Dynamic : Fair (-) Bed Mobility:  Supine to Sit: Minimum assistance  Sit to Supine: Minimum assistance  Wheelchair Mobility:     Transfers:  Sit to Stand: Minimum assistance  Stand to Sit: Contact guard assistance  Gait:     Base of Support: Center of gravity altered  Speed/Anita: Shuffled; Slow  Step Length: Left shortened;Right shortened  Gait Abnormalities: Decreased step clearance  Distance (ft): 5 Feet (ft)  Assistive Device: Walker, rolling  Ambulation - Level of Assistance: Minimal assistance  Interventions: Manual cues; Safety awareness training;Verbal cues       Body Structures Involved:  1. Heart  2. Lungs  3. Muscles Body Functions Affected:  1. Cardio  2. Respiratory  3. Neuromusculoskeletal  4. Movement Related Activities and Participation Affected:  1. Learning and Applying Knowledge  2. Mobility  3. Self Care  4. Domestic Life  5. Interpersonal Interactions and Relationships  6. Community, Social and [place]   Number of elements that affect the Plan of Care: 4+: HIGH COMPLEXITY   CLINICAL PRESENTATION:   Presentation: Stable and uncomplicated: LOW COMPLEXITY   CLINICAL DECISION MAKIN [facility] Inpatient Short Form  How much difficulty does the patient currently have. .. Unable A Lot A Little None   1. Turning over in bed (including adjusting bedclothes, sheets and blankets)? [ ] 1   [X] 2   [ ] 3   [ ] 4   2. Sitting down on and standing up from a chair with arms ( e.g., wheelchair, bedside commode, etc.)   [ ] 1   [X] 2   [ ] 3   [ ] 4   3. Moving from lying on back to sitting on the side of the bed? [ ] 1   [X] 2   [ ] 3   [ ] 4   How much help from another person does the patient currently need. .. Total A Lot A Little None   4. Moving to and from a bed to a chair (including a wheelchair)? [ ] 1   [X] 2   [ ] 3   [ ] 4   5. Need to walk in hospital room? [ ] 1   [X] 2   [ ] 3   [ ] 4   6. Climbing 3-5 steps with a railing? [X] 1   [ ] 2   [ ] 3   [ ] 4   © , Trustees of [address] [place] [id number], under license to [employer]. All rights reserved    Score:  Initial: 11 Most Recent: X (Date: -- )     Interpretation of Tool:  Represents activities that are increasingly more difficult (i.e. Bed mobility, Transfers, Gait).        Score 24 23 22-20 19-15 14-10 9-7 6       Modifier CH CI CJ CK CL CM CN         · Mobility - Walking and Moving Around:               - CURRENT STATUS:    CL - 60%-79% impaired, limited or restricted               - GOAL STATUS:           CK - 40%-59% impaired, limited or restricted               - D/C STATUS:                       ---------------To be determined---------------  [place] / [place] / Product Type: Medicare /       Medical Necessity:     · Patient demonstrates fair rehab potential due to higher previous functional level. Reason for Services/Other Comments:  · Patient continues to require modification of therapeutic interventions to increase complexity of exercises. Use of outcome tool(s) and clinical judgement create a POC that gives a: Questionable prediction of patient's progress: MODERATE COMPLEXITY                 TREATMENT:      Pre-treatment Symptoms/Complaints:  none  Pain: Initial: 0     Post Session:  C/o fatigue, no pain      Therapeutic Activity: (    15): Therapeutic activities including Bed transfers and standing activities including side steps to improve mobility, strength and balance. Required minimal Manual cues; Safety awareness training;Verbal cues to promote dynamic balance in standing. Therapeutic Exercise: ( 10):  Exercises per grid below to improve mobility, strength and balance. Required minimal verbal cues to promote proper body alignment, promote proper body posture and promote proper body mechanics. Progressed repetitions as indicated. Date:  2/27/17 Date:  3/1/17 Date:     Activity/Exercise Parameters Parameters Parameters   Ankle pumps 20 X 10    LAQs 10 X 10    marching 10 X 10    Hip ABD/ADD 10 supine X 10 seated    SLR 5 supine     Heel slides 10 supine             Braces/Orthotics/Lines/Etc:   · O2 Device: Nasal cannula  Treatment/Session Assessment:    · Response to Treatment:  Patient tolerated treatment well.   · Interdisciplinary Collaboration:  · Physical Therapy Assistant and Registered Nurse  · After treatment position/precautions:  · Supine in bed, Bed/Chair-wheels locked, Bed in low position, Call light within reach, RN notified and Visitors at bedside  · Compliance with Program/Exercises: Will assess as treatment progresses. · Recommendations/Intent for next treatment session: \"Next visit will focus on advancements to more challenging activities and reduction in assistance provided\".   Total Treatment Duration:  PT Patient Time In/Time Out  Time In: 1053  Time Out: [facility]

## 2017-03-01 NOTE — PROGRESS NOTES
TRANSFER - OUT REPORT:    Verbal report given to (name) on [facility]  being transferred to [facility](unit) for routine progression of care       Report consisted of patients Situation, Background, Assessment and   Recommendations(SBAR). Information from the following report(s) SBAR, Kardex, Procedure Summary, Intake/Output, MAR and Recent Results was reviewed with the receiving nurse. Opportunity for questions and clarification was provided.       Patient transported with:   patient belongings